# Patient Record
Sex: FEMALE | Race: WHITE | NOT HISPANIC OR LATINO | Employment: FULL TIME | URBAN - METROPOLITAN AREA
[De-identification: names, ages, dates, MRNs, and addresses within clinical notes are randomized per-mention and may not be internally consistent; named-entity substitution may affect disease eponyms.]

---

## 2023-02-20 PROBLEM — R87.613 HIGH GRADE SQUAMOUS INTRAEPITHELIAL LESION OF CERVIX: Status: ACTIVE | Noted: 2023-02-20

## 2023-02-20 PROBLEM — M54.6 THORACIC BACK PAIN: Status: ACTIVE | Noted: 2023-02-20

## 2023-02-20 PROBLEM — R10.2 PELVIC PRESSURE IN FEMALE: Status: ACTIVE | Noted: 2023-02-20

## 2023-02-20 PROBLEM — M54.50 LOW BACK PAIN: Status: ACTIVE | Noted: 2023-02-20

## 2023-02-20 PROBLEM — R73.9 HYPERGLYCEMIA: Status: ACTIVE | Noted: 2023-02-20

## 2023-02-20 PROBLEM — R10.9 ABDOMINAL PAIN: Status: ACTIVE | Noted: 2023-02-20

## 2023-02-20 PROBLEM — H93.8X3 SENSATION OF PLUGGED EAR ON BOTH SIDES: Status: ACTIVE | Noted: 2023-02-20

## 2023-02-20 PROBLEM — J30.9 ALLERGIC RHINITIS: Status: ACTIVE | Noted: 2023-02-20

## 2023-02-20 PROBLEM — N94.3 PREMENSTRUAL SYNDROME: Status: ACTIVE | Noted: 2023-02-20

## 2023-02-20 PROBLEM — N95.2 VAGINAL ATROPHY: Status: ACTIVE | Noted: 2023-02-20

## 2023-02-20 PROBLEM — N63.20 LEFT BREAST MASS: Status: ACTIVE | Noted: 2023-02-20

## 2023-02-20 PROBLEM — B35.1 MYCOTIC TOENAILS: Status: ACTIVE | Noted: 2023-02-20

## 2023-02-20 PROBLEM — R05.9 COUGH IN ADULT: Status: ACTIVE | Noted: 2023-02-20

## 2023-02-20 PROBLEM — H61.22 IMPACTED CERUMEN OF LEFT EAR: Status: ACTIVE | Noted: 2023-02-20

## 2023-02-20 PROBLEM — F41.8 DEPRESSION WITH ANXIETY: Status: ACTIVE | Noted: 2023-02-20

## 2023-02-20 PROBLEM — M25.50 ARTHRALGIA: Status: ACTIVE | Noted: 2023-02-20

## 2023-02-20 PROBLEM — E66.9 OBESITY: Status: ACTIVE | Noted: 2023-02-20

## 2023-02-20 PROBLEM — H90.0 CONDUCTIVE HEARING LOSS, BILATERAL: Status: ACTIVE | Noted: 2023-02-20

## 2023-02-20 PROBLEM — R92.8 ABNORMAL MAMMOGRAM: Status: ACTIVE | Noted: 2023-02-20

## 2023-02-20 PROBLEM — M79.89 SWELLING OF RIGHT HAND: Status: ACTIVE | Noted: 2023-02-20

## 2023-02-20 PROBLEM — R16.0 LIVER MASS: Status: ACTIVE | Noted: 2023-02-20

## 2023-02-20 PROBLEM — N95.1 PERIMENOPAUSAL SYMPTOMS: Status: ACTIVE | Noted: 2023-02-20

## 2023-02-20 PROBLEM — G43.909 HEADACHE, MIGRAINE: Status: ACTIVE | Noted: 2023-02-20

## 2023-02-20 PROBLEM — E78.00 HYPERCHOLESTEREMIA: Status: ACTIVE | Noted: 2023-02-20

## 2023-02-20 RX ORDER — PHENOL 1.4 %
AEROSOL, SPRAY (ML) MUCOUS MEMBRANE
COMMUNITY
Start: 2019-11-19 | End: 2024-05-16 | Stop reason: WASHOUT

## 2023-02-20 RX ORDER — GABAPENTIN 300 MG/1
300 CAPSULE ORAL 2 TIMES DAILY
COMMUNITY
Start: 2022-04-21 | End: 2023-11-06 | Stop reason: SDUPTHER

## 2023-02-20 RX ORDER — AZELASTINE 1 MG/ML
1 SPRAY, METERED NASAL 2 TIMES DAILY
COMMUNITY
Start: 2022-09-14 | End: 2024-02-23

## 2023-02-20 RX ORDER — ONDANSETRON HYDROCHLORIDE 8 MG/1
TABLET, FILM COATED ORAL
COMMUNITY

## 2023-02-20 RX ORDER — BUPROPION HYDROCHLORIDE 300 MG/1
1 TABLET ORAL DAILY
COMMUNITY
Start: 2019-08-13

## 2023-02-20 RX ORDER — TIZANIDINE 4 MG/1
4 TABLET ORAL EVERY 6 HOURS PRN
COMMUNITY
Start: 2021-01-19 | End: 2023-04-19 | Stop reason: ALTCHOICE

## 2023-02-20 RX ORDER — HYOSCYAMINE SULFATE 0.12 MG/1
0.12 TABLET, ORALLY DISINTEGRATING ORAL EVERY 4 HOURS PRN
COMMUNITY
Start: 2022-01-26 | End: 2023-11-06 | Stop reason: SDUPTHER

## 2023-02-20 RX ORDER — ACETAMINOPHEN 500 MG
50 TABLET ORAL DAILY
COMMUNITY

## 2023-02-20 RX ORDER — SUMATRIPTAN 50 MG/1
50 TABLET, FILM COATED ORAL ONCE AS NEEDED
COMMUNITY
Start: 2020-08-21 | End: 2023-05-23 | Stop reason: SDUPTHER

## 2023-02-20 RX ORDER — SERTRALINE HYDROCHLORIDE 50 MG/1
1 TABLET, FILM COATED ORAL DAILY
COMMUNITY
Start: 2020-09-02 | End: 2023-04-19 | Stop reason: ALTCHOICE

## 2023-04-18 PROBLEM — M54.50 LOW BACK PAIN: Status: RESOLVED | Noted: 2023-02-20 | Resolved: 2023-04-18

## 2023-04-18 PROBLEM — R92.8 ABNORMAL MAMMOGRAM: Status: RESOLVED | Noted: 2023-02-20 | Resolved: 2023-04-18

## 2023-04-18 PROBLEM — R10.9 ABDOMINAL PAIN: Status: RESOLVED | Noted: 2023-02-20 | Resolved: 2023-04-18

## 2023-04-18 PROBLEM — H61.22 IMPACTED CERUMEN OF LEFT EAR: Status: RESOLVED | Noted: 2023-02-20 | Resolved: 2023-04-18

## 2023-04-18 PROBLEM — H81.09 MENIERE'S DISEASE: Status: ACTIVE | Noted: 2023-04-18

## 2023-04-18 PROBLEM — M25.50 ARTHRALGIA: Status: RESOLVED | Noted: 2023-02-20 | Resolved: 2023-04-18

## 2023-04-18 PROBLEM — M79.89 SWELLING OF RIGHT HAND: Status: RESOLVED | Noted: 2023-02-20 | Resolved: 2023-04-18

## 2023-04-18 PROBLEM — Z00.00 ENCOUNTER FOR PREVENTATIVE ADULT HEALTH CARE EXAMINATION: Status: ACTIVE | Noted: 2023-04-18

## 2023-04-18 PROBLEM — N94.3 PREMENSTRUAL SYNDROME: Status: RESOLVED | Noted: 2023-02-20 | Resolved: 2023-04-18

## 2023-04-18 PROBLEM — H93.8X3 SENSATION OF PLUGGED EAR ON BOTH SIDES: Status: RESOLVED | Noted: 2023-02-20 | Resolved: 2023-04-18

## 2023-04-18 PROBLEM — H90.0 CONDUCTIVE HEARING LOSS, BILATERAL: Status: RESOLVED | Noted: 2023-02-20 | Resolved: 2023-04-18

## 2023-04-18 PROBLEM — R05.9 COUGH IN ADULT: Status: RESOLVED | Noted: 2023-02-20 | Resolved: 2023-04-18

## 2023-04-19 ENCOUNTER — OFFICE VISIT (OUTPATIENT)
Dept: PRIMARY CARE | Facility: CLINIC | Age: 53
End: 2023-04-19
Payer: COMMERCIAL

## 2023-04-19 VITALS
WEIGHT: 173.5 LBS | TEMPERATURE: 97.9 F | HEIGHT: 61 IN | SYSTOLIC BLOOD PRESSURE: 116 MMHG | BODY MASS INDEX: 32.76 KG/M2 | DIASTOLIC BLOOD PRESSURE: 63 MMHG | HEART RATE: 76 BPM

## 2023-04-19 DIAGNOSIS — R06.83 SNORING: ICD-10-CM

## 2023-04-19 DIAGNOSIS — H81.09 MENIERE'S DISEASE, UNSPECIFIED LATERALITY: ICD-10-CM

## 2023-04-19 DIAGNOSIS — R73.03 PREDIABETES: ICD-10-CM

## 2023-04-19 DIAGNOSIS — N89.8 ITCHING IN THE VAGINAL AREA: ICD-10-CM

## 2023-04-19 DIAGNOSIS — F41.8 DEPRESSION WITH ANXIETY: ICD-10-CM

## 2023-04-19 DIAGNOSIS — E78.00 HYPERCHOLESTEREMIA: ICD-10-CM

## 2023-04-19 DIAGNOSIS — G47.33 OBSTRUCTIVE SLEEP APNEA (ADULT) (PEDIATRIC): Primary | ICD-10-CM

## 2023-04-19 DIAGNOSIS — Z00.00 ENCOUNTER FOR PREVENTATIVE ADULT HEALTH CARE EXAMINATION: ICD-10-CM

## 2023-04-19 DIAGNOSIS — F90.9 ATTENTION DEFICIT HYPERACTIVITY DISORDER (ADHD), UNSPECIFIED ADHD TYPE: ICD-10-CM

## 2023-04-19 DIAGNOSIS — G43.809 OTHER MIGRAINE WITHOUT STATUS MIGRAINOSUS, NOT INTRACTABLE: ICD-10-CM

## 2023-04-19 DIAGNOSIS — N95.1 PERIMENOPAUSAL SYMPTOMS: ICD-10-CM

## 2023-04-19 DIAGNOSIS — Z82.49 FAMILY HISTORY OF HEART DISEASE IN MALE FAMILY MEMBER BEFORE AGE 55: ICD-10-CM

## 2023-04-19 PROBLEM — R10.2 PELVIC PRESSURE IN FEMALE: Status: RESOLVED | Noted: 2023-02-20 | Resolved: 2023-04-19

## 2023-04-19 PROCEDURE — 90471 IMMUNIZATION ADMIN: CPT | Performed by: INTERNAL MEDICINE

## 2023-04-19 PROCEDURE — 90715 TDAP VACCINE 7 YRS/> IM: CPT | Performed by: INTERNAL MEDICINE

## 2023-04-19 PROCEDURE — 90739 HEPB VACC 2/4 DOSE ADULT IM: CPT | Performed by: INTERNAL MEDICINE

## 2023-04-19 PROCEDURE — 99396 PREV VISIT EST AGE 40-64: CPT | Performed by: INTERNAL MEDICINE

## 2023-04-19 PROCEDURE — 1036F TOBACCO NON-USER: CPT | Performed by: INTERNAL MEDICINE

## 2023-04-19 PROCEDURE — 90472 IMMUNIZATION ADMIN EACH ADD: CPT | Performed by: INTERNAL MEDICINE

## 2023-04-19 RX ORDER — LISDEXAMFETAMINE DIMESYLATE 30 MG/1
30 CAPSULE ORAL EVERY MORNING
COMMUNITY

## 2023-04-19 NOTE — ASSESSMENT & PLAN NOTE
With significant family history, CAC 0 last year, interested in Lp(a) and hsCRP testing, will confirm with insurance regarding coverage.

## 2023-04-19 NOTE — PROGRESS NOTES
Subjective     Patient ID: Abril Gregory is a 52 y.o. female who presents for Annual Exam.  HPI    52-year-old female here for preventive care visit.  Last seen in January for establishment of care.    1/23: hep b not immune, lipids increased somewhat ascvd 1.8%, a1c improved somewhat  - Has been experiencing some joint stiffness with when not moving for a more prolonged period of time. At times she feels stiffness in her joints.       PMHx:  - Prediabetes A1c 5.8%   - Breast cancer risk -followed by breast surgery gets yearly MRI in addition to mammograms, declines prophylactic tamoxifen out of concern for increasing vasomotor symptoms  - Migraine headaches - On sumatriptan uses 1-2x/month, gets chills prior to onset of the migraine, gets an intense sensation/pressure in her belly thought perhaps related to abdominal migraines, then takes zofran as needed for nausea, uses hyooscyamine as needed for these symptoms  - Perimenopause with vasomotor symptoms treated supportively, now has been experiencing cold extremities and crying on a daily basis, interested in checking bloodwork. Has been experiencing emotional lability believes related to perimenopause, though not depressed   - Anxiety and depression - previously on sertraline 50mg and wellbutrin 300mg with interested in tapering sertraline out of concern that it was making her feel less passionate.  Also follows with therapist.  Sertraline was discontinued and was alleviated on Vyvanse.  Last seen by psychiatry in mid March recommended 3-month follow-up. Overall doing well on vyvanse, feeling more focused and less hungry.   - ADHD -newly diagnosed followed by psychiatry at [place] now on vyvanse   - Thoracic back pain - s/p T3-T4 spinal fusion with history of radiculopathy - resolved, on gabapentin but continues to use it which may be helping.   - Meniere's disease many years - has reduced her salt intake significantly, vertiginous symptoms precipitated by  intense exercise, ringing in her right ear, has had hearing loss on the left, uses flonase and afrin as needed, also azelastine   - Prediabetes-lifestyle modification  - HLD +  with early family history of MI (father), CAC 2021 0, looks into insurance reagarding LP(a)  - Binge eating disorder newly diagnosed followed by psychiatry [facility] ADHD prescribed lisdexamfetamine (vyvanse) 30mg , has lost 4 pounds since last visit related to reduced appetite on the medication.     Social:  - Previously a daily marijuana smoker for the past several years, quit 3 weeks ago, has had some difficulty sleeping since, started melatonin a few days ago. still problems falling asleep.   -  at [employer] [employer]  - No tobacco, occasional alcohol, no drugs   - Lives at home with two cats   - Mom and brother and cousins live around.     Lifestyle   - Diet - has been eating less, not hungry anymore. Has a great lunch with salad and a soup, dinner changes.   - Exercise - pelaton that she rides occasionally, walks to work and walks a lot at work.   - Sleep - overall well gets up to urinate all the time (has been longstanding), does snore, at times she has noted that she has to find the right position to get the right amount of air. Feels significant dryness in the mouth at night, no significant morning headaches       General: No constitutional symptoms, weight loss as described   HEENT: No headaches, changes in vision or hearing, no sinus or dental issues   Cardiac: No chest pain, palpitations, dyspnea on exertion   Lungs: No cough, wheezing, shortness of breath   Abdomen: No abdominal pain, nausea/vomiting, diarrhea or constipation   : No urinary complaints   Musculoskeletal: as described  Heme: No bleeding or thrombosis issues   Lymph: No swollen lymph glands   Skin: No rashes or lesions   Psych: as described  All other systems reviewed and are negative      Objective   Physical Exam  General: Awake,  alert, appears stated age   Head/eyes/ears: NCAT, EOMI, PERRL, TM WNL, no cerumen  Throat: moist mucus membranes, no pharyngeal erythema  Neck: Supple, nontender, no lymphadenopathy, thyroid exam unremarkable   Breast exam: declined   Heart: RRR, no murmurs, rubs or gallops  Lungs: CTA bilaterally, no rhonchi rales or wheezes   Abdomen: Soft, NT/ND  Extremities: No edema, 2+ DP pulses   Vaginal exam: chaperone offered and declined. No overt abnormalities appreciated at vulva, introitus or anus.   Skin: No concerning skin lesions on visualized skin   Neuro: AAO x 3, no FND, gait unremarkable    Assessment/Plan   Problem List Items Addressed This Visit          Nervous    Itching in the vaginal area     To followup with GYN no overt findings appreciated on exam             Endocrine/Metabolic    Prediabetes     Healthy lifestyle reviewed including diet and exercise with a focus on unprocessed Mediterranean diet, sustained aerobic exercise.            Other    Depression with anxiety     Followed by psychiatry stable on wellbutrin, tapered off sertraline. Reports emotional lability believes to be related to perimenopause, denies worsening symptoms of depression or anxiety. Has adequate followup with both psychiatry and therapy.         Headache, migraine     Stable on current regimen.         Hypercholesteremia     With significant family history, CAC 0 last year, interested in Lp(a) and hsCRP testing, will confirm with insurance regarding coverage.         Perimenopausal symptoms     Improving vasomotor symptoms with worsening emotional lability, will check FSH levels.         Relevant Orders    FSH    Meniere's disease     Chronic and stable.         Encounter for preventative adult health care examination - Primary     Age-appropriate screening performed  -No additional pertinent family history or toxic habits  -No high risk sexual behavior, declines STI screening  Cancer screening  -Pap smears no longer required    -Mammogram + breast MRI UTD   - Colonoscopy UTD 2020 repeat 5 years   - Derm - recent exam wnl.   Immunizations  -Up-to-date with flu, believes UTD with Tdap, COVID, Shingrix vaccinations, received pneumococcal vaccine.   - Not immune to Hepatitis B. Will vaccinate          Relevant Orders    Tdap vaccine, age 10 years and older  (BOOSTRIX) (Completed)    Follow Up In [facility] - PCP    Family history of heart disease in male family member before age 55    ADHD     Newly diagnosed, maintained on Vyvanse with improvement in symptoms as well as appetite.          Other Visit Diagnoses       Snoring        With possible sleep apnea, will order HSAT    Relevant Orders    Home sleep apnea test (HSAT)          Followup 1 year or prn

## 2023-04-19 NOTE — ASSESSMENT & PLAN NOTE
Healthy lifestyle reviewed including diet and exercise with a focus on unprocessed Mediterranean diet, sustained aerobic exercise.

## 2023-04-19 NOTE — ASSESSMENT & PLAN NOTE
Age-appropriate screening performed  -No additional pertinent family history or toxic habits  -No high risk sexual behavior, declines STI screening  Cancer screening  -Pap smears no longer required   -Mammogram + breast MRI UTD   - Colonoscopy UTD 2020 repeat 5 years   - Derm - recent exam wnl.   Immunizations  -Up-to-date with flu, believes UTD with Tdap, COVID, Shingrix vaccinations, received pneumococcal vaccine.   - Not immune to Hepatitis B. Will vaccinate

## 2023-04-20 NOTE — ASSESSMENT & PLAN NOTE
Followed by psychiatry stable on wellbutrin, tapered off sertraline. Reports emotional lability believes to be related to perimenopause, denies worsening symptoms of depression or anxiety. Has adequate followup with both psychiatry and therapy.

## 2023-05-18 ENCOUNTER — APPOINTMENT (OUTPATIENT)
Dept: PRIMARY CARE | Facility: CLINIC | Age: 53
End: 2023-05-18
Payer: COMMERCIAL

## 2023-05-18 ENCOUNTER — CLINICAL SUPPORT (OUTPATIENT)
Dept: PRIMARY CARE | Facility: CLINIC | Age: 53
End: 2023-05-18
Payer: COMMERCIAL

## 2023-05-18 DIAGNOSIS — Z23 NEED FOR HEPATITIS B VACCINATION: Primary | ICD-10-CM

## 2023-05-18 PROCEDURE — 90471 IMMUNIZATION ADMIN: CPT | Performed by: INTERNAL MEDICINE

## 2023-05-18 PROCEDURE — 90743 HEPB VACC 2 DOSE ADOLESC IM: CPT | Performed by: INTERNAL MEDICINE

## 2023-05-19 ENCOUNTER — APPOINTMENT (OUTPATIENT)
Dept: PRIMARY CARE | Facility: CLINIC | Age: 53
End: 2023-05-19
Payer: COMMERCIAL

## 2023-05-23 DIAGNOSIS — G43.809 OTHER MIGRAINE WITHOUT STATUS MIGRAINOSUS, NOT INTRACTABLE: Primary | ICD-10-CM

## 2023-05-23 RX ORDER — SUMATRIPTAN 50 MG/1
50 TABLET, FILM COATED ORAL ONCE AS NEEDED
Qty: 9 TABLET | Refills: 1 | Status: SHIPPED | OUTPATIENT
Start: 2023-05-23 | End: 2023-11-08 | Stop reason: SDUPTHER

## 2023-09-28 LAB
ESTRADIOL (PG/ML) IN SER/PLAS: 142 PG/ML
FOLLITROPIN (IU/L) IN SER/PLAS: 8.5 IU/L
LUTEINIZING HORMONE (IU/ML) IN SER/PLAS: 8.1 IU/L

## 2023-11-06 DIAGNOSIS — G43.809 OTHER MIGRAINE WITHOUT STATUS MIGRAINOSUS, NOT INTRACTABLE: ICD-10-CM

## 2023-11-06 DIAGNOSIS — M54.6 THORACIC BACK PAIN, UNSPECIFIED BACK PAIN LATERALITY, UNSPECIFIED CHRONICITY: Primary | ICD-10-CM

## 2023-11-06 RX ORDER — HYOSCYAMINE SULFATE 0.12 MG/1
0.12 TABLET, ORALLY DISINTEGRATING ORAL EVERY 4 HOURS PRN
Qty: 30 TABLET | Refills: 0 | Status: SHIPPED | OUTPATIENT
Start: 2023-11-06 | End: 2023-11-08 | Stop reason: SDUPTHER

## 2023-11-06 RX ORDER — GABAPENTIN 300 MG/1
300 CAPSULE ORAL 2 TIMES DAILY
Qty: 60 CAPSULE | Refills: 0 | Status: SHIPPED | OUTPATIENT
Start: 2023-11-06 | End: 2023-11-08 | Stop reason: SDUPTHER

## 2023-11-06 NOTE — TELEPHONE ENCOUNTER
Pt requesting refills on her meds. She originally got them from her previous PCP and know that she might not be able to get them refilled with you.     Appt has been scheduled.

## 2023-11-08 ENCOUNTER — OFFICE VISIT (OUTPATIENT)
Dept: PRIMARY CARE | Facility: CLINIC | Age: 53
End: 2023-11-08
Payer: COMMERCIAL

## 2023-11-08 VITALS — OXYGEN SATURATION: 96 % | DIASTOLIC BLOOD PRESSURE: 74 MMHG | HEART RATE: 83 BPM | SYSTOLIC BLOOD PRESSURE: 127 MMHG

## 2023-11-08 DIAGNOSIS — Z00.00 ENCOUNTER FOR PREVENTATIVE ADULT HEALTH CARE EXAMINATION: ICD-10-CM

## 2023-11-08 DIAGNOSIS — G43.809 OTHER MIGRAINE WITHOUT STATUS MIGRAINOSUS, NOT INTRACTABLE: ICD-10-CM

## 2023-11-08 DIAGNOSIS — M54.6 THORACIC BACK PAIN, UNSPECIFIED BACK PAIN LATERALITY, UNSPECIFIED CHRONICITY: ICD-10-CM

## 2023-11-08 DIAGNOSIS — H81.09 MENIERE'S DISEASE, UNSPECIFIED LATERALITY: ICD-10-CM

## 2023-11-08 DIAGNOSIS — Z23 IMMUNIZATION DUE: Primary | ICD-10-CM

## 2023-11-08 PROCEDURE — 90471 IMMUNIZATION ADMIN: CPT | Performed by: INTERNAL MEDICINE

## 2023-11-08 PROCEDURE — 90743 HEPB VACC 2 DOSE ADOLESC IM: CPT | Performed by: INTERNAL MEDICINE

## 2023-11-08 PROCEDURE — 1036F TOBACCO NON-USER: CPT | Performed by: INTERNAL MEDICINE

## 2023-11-08 PROCEDURE — 99214 OFFICE O/P EST MOD 30 MIN: CPT | Performed by: INTERNAL MEDICINE

## 2023-11-08 RX ORDER — GABAPENTIN 300 MG/1
300 CAPSULE ORAL 2 TIMES DAILY
Qty: 180 CAPSULE | Refills: 1 | Status: SHIPPED | OUTPATIENT
Start: 2023-11-08 | End: 2024-05-06

## 2023-11-08 RX ORDER — SUMATRIPTAN 20 MG/1
SPRAY NASAL
Qty: 1 EACH | Refills: 0 | Status: SHIPPED | OUTPATIENT
Start: 2023-11-08

## 2023-11-08 RX ORDER — ONDANSETRON 8 MG/1
8 TABLET, ORALLY DISINTEGRATING ORAL EVERY 8 HOURS PRN
Qty: 20 TABLET | Refills: 1 | Status: SHIPPED | OUTPATIENT
Start: 2023-11-08 | End: 2023-11-15

## 2023-11-08 RX ORDER — SUMATRIPTAN 50 MG/1
50 TABLET, FILM COATED ORAL ONCE AS NEEDED
Qty: 9 TABLET | Refills: 3 | Status: SHIPPED | OUTPATIENT
Start: 2023-11-08 | End: 2023-12-08

## 2023-11-08 RX ORDER — HYOSCYAMINE SULFATE 0.12 MG/1
0.12 TABLET, ORALLY DISINTEGRATING ORAL EVERY 4 HOURS PRN
Qty: 30 TABLET | Refills: 0 | Status: SHIPPED | OUTPATIENT
Start: 2023-11-08 | End: 2023-12-08

## 2023-11-08 ASSESSMENT — PATIENT HEALTH QUESTIONNAIRE - PHQ9
1. LITTLE INTEREST OR PLEASURE IN DOING THINGS: NOT AT ALL
SUM OF ALL RESPONSES TO PHQ9 QUESTIONS 1 AND 2: 0
2. FEELING DOWN, DEPRESSED OR HOPELESS: NOT AT ALL

## 2023-11-08 ASSESSMENT — PAIN SCALES - GENERAL: PAINLEVEL: 0-NO PAIN

## 2023-11-08 NOTE — PATIENT INSTRUCTIONS
Abril,   Followup in April for your physical with labs 1 week prior   Migrianes - take magnesium every day + ribloflavin (400mg of each) daily to help prevent migraine headaches   Prescription for intranasal formulation of imitrex. Use as directed   Schedule sleep study

## 2023-11-08 NOTE — PROGRESS NOTES
Subjective   Patient ID: Abril Gregory is a 53 y.o. female who presents for Follow-up.  HPI  53F here for followup visit, last seen in April.     - Migraine headaches - On sumatriptan uses 1-2x/month, gets chills prior to onset of the migraine, gets an intense sensation/pressure in her belly thought perhaps related to abdominal migraines, then takes zofran as needed for nausea, uses hyooscyamine as needed for these symptoms. She has been noting more frequent migraine headaches. Since the end of August has had 6 migraine headaches, more than she has had in a while. They happen at sleep so wakes up and vomits violently. Prior to onset of migraines gets diffuse chills, typically occurs in the middle of the month, is miserable for days, sometimes gets abdominal migraines which hyoscyamine helps and also helps with the chills. She reports that the migraine symptoms that have wakened her up at night has had onset since the diagnosis of her headaches over 20 years ago, though over the last several months they are progressively worsening.   - Thoracic back pain - s/p T3-T4 spinal fusion with history of radiculopathy - resolved, on gabapentin has been taking nightly but makes her feel sleepy.     PMHx:  - Prediabetes A1c 5.8%   - Breast cancer risk -followed by breast surgery gets yearly MRI in addition to mammograms, declines prophylactic tamoxifen out of concern for increasing vasomotor symptoms  - Perimenopause with vasomotor symptoms treated supportively  - Anxiety and depression - on wellbutrin stable  - ADHD -newly diagnosed followed by psychiatry at [facility] now on vyvanse   - Meniere's disease many years - has reduced her salt intake significantly, vertiginous symptoms precipitated by intense exercise, ringing in her right ear, has had hearing loss on the left, uses flonase and afrin as needed, also azelastine   - Prediabetes-lifestyle modification  - HLD +  with early family history of MI (father), CAC 2021  0, looks into insurance reagarding LP(a)  - Binge eating disorder newly diagnosed followed by psychiatry [facility] ADHD prescribed lisdexamfetamine (vyvanse) 30mg , has lost 4 pounds since last visit related to reduced appetite on the medication.      Social:  - Previously a daily marijuana smoker for the past several years, quit 3 weeks ago, has had some difficulty sleeping since, started melatonin a few days ago. still problems falling asleep.   -  at [employer]  - No tobacco, occasional alcohol, no drugs   - Lives at home with two cats   - Mom and brother and cousins live around.     Current Outpatient Medications   Medication Instructions    azelastine (Astelin) 137 mcg (0.1 %) nasal spray 1 spray, nasal, 2 times daily    buPROPion XL (Wellbutrin XL) 300 mg 24 hr tablet 1 tablet, oral, Daily    cholecalciferol (VITAMIN D-3) 50 mcg, oral, Daily    gabapentin (NEURONTIN) 300 mg, oral, 2 times daily    hyoscyamine (ANASPAZ) 0.125 mg, sublingual, Every 4 hours PRN    lisdexamfetamine (VYVANSE) 30 mg, oral, Every morning    multivitamin-min-iron-FA-vit K (Adults Multivitamin) 18 mg iron-400 mcg-25 mcg tablet oral    ondansetron (Zofran) 8 mg tablet oral    ondansetron ODT (ZOFRAN-ODT) 8 mg, oral, Every 8 hours PRN    SUMAtriptan (Imitrex) 20 mg/actuation nasal spray 20 mg as a single dose in 1 nostril; if symptoms persist or return, may repeat dose after =2 hours.    SUMAtriptan (IMITREX) 50 mg, oral, Once as needed        Objective     /74   Pulse 83   SpO2 96%     Physical Exam  ,General: Alert and oriented, in no apparent distress   HEENT: No conjunctival erythema, no external facial lesions   Lungs: Breathing comfortably  Skin: No evidence of skin breakdown.  Neuro: AAO x 3, answering questions appropriately, no obvious cranial nerve deficits    Assessment/Plan   Problem List Items Addressed This Visit       Headache, migraine     With alarm feature of awakening her  at night and progressively worsening. Will obtain brain imaging to rule out additional abnormality, preferably an MRI given history of Meniere's disease as well.          Relevant Medications    SUMAtriptan (Imitrex) 20 mg/actuation nasal spray    ondansetron ODT (Zofran-ODT) 8 mg disintegrating tablet    hyoscyamine 0.125 mg disintegrating tablet    SUMAtriptan (Imitrex) 50 mg tablet    Thoracic back pain  With neuropathy improved with nightly gabapentin.     Relevant Medications    gabapentin (Neurontin) 300 mg capsule    Encounter for preventative adult health care examination    Relevant Orders    CBC and Auto Differential    Comprehensive Metabolic Panel    Lipid Panel    Hemoglobin A1C    TSH with reflex to Free T4 if abnormal    Vitamin D 25-Hydroxy,Total (for eval of Vitamin D levels)     Other Visit Diagnoses       Immunization due    -  Primary    Relevant Orders    Hepatitis B vaccine, 20 yrs and older (RECOMBIVAX, ENGERIX) (Completed)

## 2023-11-13 RX ORDER — HYOSCYAMINE SULFATE 0.12 MG/1
TABLET, ORALLY DISINTEGRATING ORAL
Qty: 30 TABLET | Refills: 0 | Status: SHIPPED | OUTPATIENT
Start: 2023-11-13 | End: 2023-12-04

## 2023-11-22 DIAGNOSIS — G43.809 OTHER MIGRAINE WITHOUT STATUS MIGRAINOSUS, NOT INTRACTABLE: ICD-10-CM

## 2023-11-24 DIAGNOSIS — G43.809 OTHER MIGRAINE WITHOUT STATUS MIGRAINOSUS, NOT INTRACTABLE: ICD-10-CM

## 2023-11-27 NOTE — PROGRESS NOTES
AUDIOLOGY ADULT AUDIOMETRIC EVALUATION      Name:  Abril Gregory  :  1970  Age:  53 y.o.  Date of Evaluation:  2023    IMPRESSIONS     Today's test results are consistent with normal sloping to severe conductive hearing loss in the LE and a moderately-severe rising to mild and sloping again to moderate mixed hearing loss in the RE. Results show progression in thresholds when compared with most recent hearing evaluation performed on 2020. Discussed results and recommendations with patient.  Questions were addressed and the patient was encouraged to contact our department should concerns arise.    RECOMMENDATIONS     Continue medical follow up with PCP/ENT.  Return for annual hearing evaluation or sooner should concerns arise.    Time: 1354-1168    HISTORY     Reason for visit:  Abril Gregory is seen today at the request of Ilene Barr CNP for an evaluation of hearing.  Patient has a known history of ear surgeries bilaterally in childhood (reportedly tympanoplasty and ossicular chain repair). Additionally, in the past she has reported previous diagnosis of Meniere's Disease in LE with associated reduced hearing and dizziness. Today, she reported decrease in hearing with intermittent bilateral tinnitus. Previous history of vertigo/spinning. Patient denied history of aural fullness or excessive noise exposure. No history of hearing aid use.    TEST RESULTS     Otoscopic Evaluation:  Right Ear: Clear ear canal.  Left Ear: Clear ear canal.    Tympanometry & Acoustic Reflexes:  Right Ear: Restricted eardrum mobility consistent with outer/middle ear involvement.   Left Ear: Negative middle ear pressure.    Pure Tone Audiometry:    Right Ear: Moderately-severe mixed hearing loss rising to mild  from 500-6000 Hz, and sloping again to a moderate mixed hearing loss.  Left Ear: Mild conductive hearing loss from 125-250 Hz rising to WNL and sloping from 500-2000 Hz, and sloping again to a severe conductive  hearing loss.    Speech Audiometry:   Right Ear:  Speech Reception Threshold (SRT) was obtained at 40 dBHL. Word Recognition scores were excellent (100%) in quiet when words were presented at 75 dBHL.  Left Ear:  Speech Reception Threshold (SRT) was obtained at 25 dBHL. Word Recognition scores were excellent (100%) in quiet when words were presented at 75 dBHL.    Distortion Product Otoacoustic Emissions:  DNT.    Testing and interpretation of results completed KYLIE Dozier, CCC-A. It was my pleasure to evaluate this patient.       KYLIE Dozier, CCC-A  Licensed Audiologist      Degree of Hearing Sensitivity Decibel Range   Within Normal Limits (WNL) 0-25   Mild 26-40   Moderate 41-55   Moderately-Severe 56-70   Severe 71-90   Profound 91+      Key   CNT/DNT Could Not Test/Did Not Test   TM Tympanic Membrane   WNL Within Normal Limits   HA Hearing Aid   SNHL Sensorineural Hearing Loss   CHL Conductive Hearing Loss   NIHL Noise-Induced Hearing Loss   ECV Ear Canal Volume   RE/LE Right Ear/Left Ear        AUDIOGRAM

## 2023-11-28 ENCOUNTER — APPOINTMENT (OUTPATIENT)
Dept: PRIMARY CARE | Facility: CLINIC | Age: 53
End: 2023-11-28
Payer: COMMERCIAL

## 2023-11-29 ENCOUNTER — CLINICAL SUPPORT (OUTPATIENT)
Dept: AUDIOLOGY | Facility: CLINIC | Age: 53
End: 2023-11-29
Payer: COMMERCIAL

## 2023-11-29 ENCOUNTER — OFFICE VISIT (OUTPATIENT)
Dept: OTOLARYNGOLOGY | Facility: CLINIC | Age: 53
End: 2023-11-29
Payer: COMMERCIAL

## 2023-11-29 VITALS — BODY MASS INDEX: 32.1 KG/M2 | WEIGHT: 170 LBS | HEIGHT: 61 IN

## 2023-11-29 DIAGNOSIS — H90.6 MIXED CONDUCTIVE AND SENSORINEURAL HEARING LOSS OF BOTH EARS: Primary | ICD-10-CM

## 2023-11-29 DIAGNOSIS — H69.93 DYSFUNCTION OF BOTH EUSTACHIAN TUBES: ICD-10-CM

## 2023-11-29 DIAGNOSIS — H61.23 BILATERAL IMPACTED CERUMEN: Primary | ICD-10-CM

## 2023-11-29 DIAGNOSIS — H90.6 MIXED CONDUCTIVE AND SENSORINEURAL HEARING LOSS OF BOTH EARS: ICD-10-CM

## 2023-11-29 DIAGNOSIS — L29.9 EAR ITCH: ICD-10-CM

## 2023-11-29 PROCEDURE — 92567 TYMPANOMETRY: CPT

## 2023-11-29 PROCEDURE — 99204 OFFICE O/P NEW MOD 45 MIN: CPT | Performed by: NURSE PRACTITIONER

## 2023-11-29 PROCEDURE — 92557 COMPREHENSIVE HEARING TEST: CPT

## 2023-11-29 PROCEDURE — 1036F TOBACCO NON-USER: CPT | Performed by: NURSE PRACTITIONER

## 2023-11-29 RX ORDER — MOMETASONE FUROATE 1 MG/G
CREAM TOPICAL
Qty: 15 G | Refills: 1 | Status: SHIPPED | OUTPATIENT
Start: 2023-11-29

## 2023-11-29 NOTE — PROGRESS NOTES
Subjective   Patient ID: Abril Gregory is a 53 y.o. female who presents for Hearing Loss.  Hearing Loss      This patient is referred for evaluation of a sensation of right greater than left itching.  The patient is not accompanied by anyone.   When asked about ear pain, hearing loss, itching, discharge from ear, tinnitus, aural fullness or autophony, the patient admits to bilateral hearing loss and right greater than left itching/flaking skin.  She endorses hearing loss nearly all of her life but feels that it has gradually progressed.  The patient does not wear a hearing aid.  When asked about a significant past otological history including history of prior ear surgery, noise exposure, exposure to ototoxic drugs or agents, and/or family history of hearing loss, the patient admits to left tympanomastoidectomy and right possible tympanoplasty as well as PE tube placements.  Surgeries were done as a child living in [place].  She also reports being diagnosed with Ménière's disease in her 30s while living in [facility].  She does not currently have any vertigo.  Her PCP has recently ordered a MRI due to migraines.  Patient uses azelastine spray.    Review of Systems   HENT:  Positive for hearing loss.      A comprehensive or 10 points review of the patient´s constitutional, neurological, HEENT, pulmonary, cardiovascular and genito-urinary systems showed only those mentioned in history of present illness.    Objective   Physical Exam  Constitutional: no fever, chills, weight loss or weight gain   General appearance: Appears well, well-nourished, well groomed. No acute distress.   Communication: Normal communication   Psychiatric: Oriented to person, place and time. Normal mood and affect.   Neurologic: Cranial nerves II-XII grossly intact and symmetric bilaterally.   Head and Face:   Head: Atraumatic with no masses, lesions or scarring.   Face: Normal symmetry, no paralysis, synkinesis or facial tic. No scars or deformities.      Eyes: Conjunctiva not edematous or erythematous   Ears: External inspection of ears with no deformity, scars or masses with the exception of flaking to right conchal bowl.  Bilateral canals narrow with cerumen impactions.     Neck: Normal appearing, symmetric, trachea midline.   Cardiovascular: Examination of peripheral vascular system shows no clubbing or cyanosis.   Respiratory: No respiratory distress increased work of breathing. Inspection of the chest with symmetric chest expansion and normal respiratory effort.   Skin: No rashes in the head or neck  My interpretation of the audiogram done today is right-sided with moderate mixed hearing loss and left side with mild to moderate mixed hearing loss.  Excellent word recognition scores bilaterally.  Type B tympanogram on the right with small canal volume and type C on the left.    When compared to her previous audiogram from July 2020, there has been progression of bilateral conductive hearing loss especially on the right in the low pitches.  Previous tympanograms were normal.  Assessment/Plan     This patient presents for initial evaluation of acute acquired bilateral cerumen impaction as well as chronic bilateral ear itching, bilateral mixed hearing loss, and bilateral eustachian tube dysfunction.    We discussed that right TM is quite retracted but left graft looks good.  I recommended she continue her azelastine but also add Flonase and cycled Afrin.  Given the progression of her conductive hearing loss, I recommended CT IAC follow-up with one of my surgical partners.  I also recommended mometasone cream for her ear itching.  We discussed that her canals are very narrow and I am hesitant to order any steroid drops at this time.  Her canal itching may have been due to the cerumen.  If so, this should have resolved with cleaning.  If itching does not improve, asked that she contact my office.  Patient is in agreement with the plan.  All questions were  answered to patient's satisfaction.    This note was created using speech recognition transcription software. Despite proofreading, several typographical errors might be present that might affect the meaning of the content. Please call with any questions.     Patient ID: Abril Gregory is a 53 y.o. female.    Ear cerumen removal    Date/Time: 11/29/2023 4:04 PM    Performed by: ANNA Collado  Authorized by: ANNA Collado    Consent:     Consent obtained:  Verbal    Consent given by:  Patient    Risks discussed:  Pain    Alternatives discussed:  No treatment  Procedure details:     Location:  L ear and R ear    Procedure type comment:  Right angle hook and suction    Procedure outcomes: cerumen removed    Post-procedure details:     Inspection:  No bleeding, ear canal clear and TM intact    Hearing quality:  Normal    Procedure completion:  Tolerated well, no immediate complications  Comments:      Under the microscope, right TM is quite retracted centrally.  Left graft intact.  No obvious recurrence of cholesteatoma noted, but bilateral EACs are quite narrow.

## 2023-12-04 RX ORDER — HYOSCYAMINE SULFATE 0.12 MG/1
TABLET, ORALLY DISINTEGRATING ORAL
Qty: 30 TABLET | Refills: 0 | Status: SHIPPED | OUTPATIENT
Start: 2023-12-04

## 2023-12-11 RX ORDER — HYOSCYAMINE SULFATE 0.12 MG/1
TABLET, ORALLY DISINTEGRATING ORAL
Qty: 30 TABLET | Refills: 0 | OUTPATIENT
Start: 2023-12-11

## 2023-12-18 ENCOUNTER — APPOINTMENT (OUTPATIENT)
Dept: RADIOLOGY | Facility: CLINIC | Age: 53
End: 2023-12-18
Payer: COMMERCIAL

## 2023-12-28 ENCOUNTER — TELEPHONE (OUTPATIENT)
Dept: PRIMARY CARE | Facility: CLINIC | Age: 53
End: 2023-12-28
Payer: COMMERCIAL

## 2023-12-28 NOTE — TELEPHONE ENCOUNTER
This is for the on call Doctor patient is not feeling well and she is requesting medication for a cough she is outside of [facility] so if medication is approved she needs it sent to the  [facility] I've already changed the pharmacy in this chart I did advise patient the on call doctor does not start until 5 pm .

## 2024-02-23 DIAGNOSIS — J30.9 ALLERGIC RHINITIS, UNSPECIFIED SEASONALITY, UNSPECIFIED TRIGGER: Primary | ICD-10-CM

## 2024-02-23 RX ORDER — AZELASTINE 1 MG/ML
2 SPRAY, METERED NASAL 2 TIMES DAILY
Qty: 30 ML | Refills: 2 | Status: SHIPPED | OUTPATIENT
Start: 2024-02-23

## 2024-04-03 ENCOUNTER — LAB (OUTPATIENT)
Dept: LAB | Facility: LAB | Age: 54
End: 2024-04-03
Payer: COMMERCIAL

## 2024-04-03 DIAGNOSIS — N95.1 PERIMENOPAUSAL SYMPTOMS: ICD-10-CM

## 2024-04-03 DIAGNOSIS — Z00.00 ENCOUNTER FOR PREVENTATIVE ADULT HEALTH CARE EXAMINATION: ICD-10-CM

## 2024-04-03 LAB
25(OH)D3 SERPL-MCNC: 45 NG/ML (ref 30–100)
ALBUMIN SERPL BCP-MCNC: 3.9 G/DL (ref 3.4–5)
ALP SERPL-CCNC: 55 U/L (ref 33–110)
ALT SERPL W P-5'-P-CCNC: 6 U/L (ref 7–45)
ANION GAP SERPL CALC-SCNC: 11 MMOL/L (ref 10–20)
AST SERPL W P-5'-P-CCNC: 10 U/L (ref 9–39)
BASOPHILS # BLD AUTO: 0.05 X10*3/UL (ref 0–0.1)
BASOPHILS NFR BLD AUTO: 0.7 %
BILIRUB SERPL-MCNC: 0.4 MG/DL (ref 0–1.2)
BUN SERPL-MCNC: 18 MG/DL (ref 6–23)
CALCIUM SERPL-MCNC: 8.9 MG/DL (ref 8.6–10.6)
CHLORIDE SERPL-SCNC: 103 MMOL/L (ref 98–107)
CHOLEST SERPL-MCNC: 201 MG/DL (ref 0–199)
CHOLESTEROL/HDL RATIO: 3.6
CO2 SERPL-SCNC: 28 MMOL/L (ref 21–32)
CREAT SERPL-MCNC: 0.75 MG/DL (ref 0.5–1.05)
EGFRCR SERPLBLD CKD-EPI 2021: >90 ML/MIN/1.73M*2
EOSINOPHIL # BLD AUTO: 0.11 X10*3/UL (ref 0–0.7)
EOSINOPHIL NFR BLD AUTO: 1.5 %
ERYTHROCYTE [DISTWIDTH] IN BLOOD BY AUTOMATED COUNT: 13.2 % (ref 11.5–14.5)
EST. AVERAGE GLUCOSE BLD GHB EST-MCNC: 114 MG/DL
FSH SERPL-ACNC: 6.9 IU/L
GLUCOSE SERPL-MCNC: 74 MG/DL (ref 74–99)
HBA1C MFR BLD: 5.6 %
HCT VFR BLD AUTO: 41.3 % (ref 36–46)
HDLC SERPL-MCNC: 55.6 MG/DL
HGB BLD-MCNC: 12.8 G/DL (ref 12–16)
IMM GRANULOCYTES # BLD AUTO: 0.02 X10*3/UL (ref 0–0.7)
IMM GRANULOCYTES NFR BLD AUTO: 0.3 % (ref 0–0.9)
LDLC SERPL CALC-MCNC: 127 MG/DL
LYMPHOCYTES # BLD AUTO: 2.98 X10*3/UL (ref 1.2–4.8)
LYMPHOCYTES NFR BLD AUTO: 39.8 %
MCH RBC QN AUTO: 28.6 PG (ref 26–34)
MCHC RBC AUTO-ENTMCNC: 31 G/DL (ref 32–36)
MCV RBC AUTO: 92 FL (ref 80–100)
MONOCYTES # BLD AUTO: 0.51 X10*3/UL (ref 0.1–1)
MONOCYTES NFR BLD AUTO: 6.8 %
NEUTROPHILS # BLD AUTO: 3.82 X10*3/UL (ref 1.2–7.7)
NEUTROPHILS NFR BLD AUTO: 50.9 %
NON HDL CHOLESTEROL: 145 MG/DL (ref 0–149)
NRBC BLD-RTO: 0 /100 WBCS (ref 0–0)
PLATELET # BLD AUTO: 349 X10*3/UL (ref 150–450)
POTASSIUM SERPL-SCNC: 4 MMOL/L (ref 3.5–5.3)
PROT SERPL-MCNC: 6.5 G/DL (ref 6.4–8.2)
RBC # BLD AUTO: 4.47 X10*6/UL (ref 4–5.2)
SODIUM SERPL-SCNC: 138 MMOL/L (ref 136–145)
TRIGL SERPL-MCNC: 93 MG/DL (ref 0–149)
TSH SERPL-ACNC: 1.22 MIU/L (ref 0.44–3.98)
VLDL: 19 MG/DL (ref 0–40)
WBC # BLD AUTO: 7.5 X10*3/UL (ref 4.4–11.3)

## 2024-04-03 PROCEDURE — 36415 COLL VENOUS BLD VENIPUNCTURE: CPT

## 2024-04-03 PROCEDURE — 84443 ASSAY THYROID STIM HORMONE: CPT

## 2024-04-03 PROCEDURE — 85025 COMPLETE CBC W/AUTO DIFF WBC: CPT

## 2024-04-03 PROCEDURE — 83036 HEMOGLOBIN GLYCOSYLATED A1C: CPT

## 2024-04-03 PROCEDURE — 80061 LIPID PANEL: CPT

## 2024-04-03 PROCEDURE — 83001 ASSAY OF GONADOTROPIN (FSH): CPT

## 2024-04-03 PROCEDURE — 80053 COMPREHEN METABOLIC PANEL: CPT

## 2024-04-03 PROCEDURE — 82306 VITAMIN D 25 HYDROXY: CPT

## 2024-04-09 NOTE — PROGRESS NOTES
"Subjective     Patient ID: Abril Gregory is a 53 y.o. female who presents for No chief complaint on file..  HPI    53F here for preventive care visit, last seen in November.    6/23: can't schedule sleep study.   4/24: prediabetes resolved, lipids I'm;proved to 120s, FSH no menopause otherwise labs good.       - - Meniere's disease many years - has reduced her salt intake significantly, vertiginous symptoms precipitated by intense exercise, ringing in her right ear, has had hearing loss on the left, uses flonase and afrin as needed, also azelastine . She was seen by ENT in November concern for ear complaints found to have cerumen impaction status post removal, there was concern regarding progression of hearing loss recommended follow-up and CT of the internal auditory canal, medicine cream for itching. CT not yet done, has visit scheduled next week.     For the last few years has experienced stabbing discomfort in the RUQ comes and goes, lasts an hour or so, feels as if an abdominal migraine is coming on but specific to the RUQ. No radiation, no association with food, no nausea/vomiting, no changes in bowel movements. Cannot recall onset. Associated with diffuse pinprick sensation, isolated locally to that region.   - Vaginal itching no discharge     PMHx:  - Migraine headaches with possible abdominal migrainaous features -with alarm features waking her up at night and progressively worsening ordered for MRI not yet obtained.  She was prescribed sumatriptan and Zofran and hyoscyamine. She notes that these types of symptoms have been the same since her childhood and has had history of waking her up at night since her 20s, and elected not to pursue this. Continues to get monthly migraines associated with ovulation. Headaches begin with chills and \"pinpricks all over\" then \"I am an accordion being squeezed\". When taking hyoscyamine and other medications symptoms did improve.   - Thoracic back pain - s/p T3/4 spinal " fusion with history of radiculopathy - resolved on nightly gabapentin  - Prediabetes A1c 5.8% now resolved.   - Breast cancer risk - followed by breast surgery gets yearly MRI in addition to mammograms, declines prophylactic tamoxifen out of concern for increasing vasomotor symptoms. Has not had MRI in some time, viist scheduled in September, hesitant regarding FAST MRI.  - Perimenopause with vasomotor symptoms treated supportively  - Anxiety and depression - on wellbutrin stable  - ADHD - followed by psychiatry at [facility] as well as therapist  - on vyvanse and Wellbutrin last seen by Dr. Echeverria last month  - Prediabetes-lifestyle modification  - HLD +  with early family history of MI (father), CAC 2021 0, looks into insurance reagarding LP(a)  - B[facility] newly diagnosed [facility] lisdex[facility] (vyvanse) 30mg, lost 8 pounds since last seen in November.   - Cataracts pending surgery in the summer.  - Snoring - HSAT pending to be done.  - Eczema     Social:  - Previously a daily marijuana smoker for the past several years, quit 4 months as ago  has had some difficulty sleeping since, started melatonin a few days ago. still problems falling asleep.   -  at [employer], [employer]  - No tobacco, occasional alcohol  - Lives at home with two cats   - Mom and brother and cousins live around.     Lifestyle   - Diet - eating less since starting the vyvanse, just less. Tries to eat healthy, not much meat. Vegetables. Lots of stir fries   - Exercise - Peloton and long walks, some hiking.  - Sleep - difficulty pending HSAT to be performed. Sleeps with marijuana use, tried melatonin and multiple sleep supplements.     Review of Systems:   General: No constitutional symptoms, 8 pound weight loss since last being seen.   HEENT: No headaches, changes in vision or hearing, no sinus or dental issues   Cardiac: No chest pain, palpitations,  dyspnea on exertion   Lungs: No cough, wheezing, shortness of breath   Abdomen: as described   : No urinary complaints   Musculoskeletal: no joint pains, swelling or tenderness   Heme: No bleeding or thrombosis issues   Lymph: No swollen lymph glands   Skin: eczematous lesions   Psych: as described     Objective       Current Outpatient Medications:     azelastine (Astelin) 137 mcg (0.1 %) nasal spray, USE 2 SPRAYS IN EACH NOSTRIL TWICE DAILY, Disp: 30 mL, Rfl: 2    buPROPion XL (Wellbutrin XL) 300 mg 24 hr tablet, Take 1 tablet (300 mg) by mouth once daily., Disp: , Rfl:     cholecalciferol (Vitamin D-3) 50 mcg (2,000 unit) capsule, Take 1 capsule (50 mcg) by mouth early in the morning.., Disp: , Rfl:     gabapentin (Neurontin) 300 mg capsule, Take 1 capsule (300 mg) by mouth 2 times a day., Disp: 180 capsule, Rfl: 1    hyoscyamine 0.125 mg disintegrating tablet, Place 1 tablet (0.125 mg) under the tongue every 4 hours if needed (PRN)., Disp: 30 tablet, Rfl: 0    hyoscyamine 0.125 mg disintegrating tablet, DISSOLVE 1 TABLET(0.125 MG) UNDER THE TONGUE EVERY 4 HOURS AS NEEDED, Disp: 30 tablet, Rfl: 0    lisdexamfetamine (Vyvanse) 30 mg capsule, Take 1 capsule (30 mg) by mouth once daily in the morning., Disp: , Rfl:     mometasone (Elocon) 0.1 % cream, Apply thin film to itching/flaking skin of outer ears twice daily x 7 days then use as needed., Disp: 15 g, Rfl: 1    multivitamin-min-iron-FA-vit K (Adults Multivitamin) 18 mg iron-400 mcg-25 mcg tablet, Take by mouth., Disp: , Rfl:     ondansetron (Zofran) 8 mg tablet, Take by mouth., Disp: , Rfl:     SUMAtriptan (Imitrex) 20 mg/actuation nasal spray, 20 mg as a single dose in 1 nostril; if symptoms persist or return, may repeat dose after =2 hours., Disp: 1 each, Rfl: 0    SUMAtriptan (Imitrex) 50 mg tablet, Take 1 tablet (50 mg) by mouth 1 time if needed for migraine., Disp: 9 tablet, Rfl: 3      There were no vitals taken for this visit.      Physical  Examination:   General: Awake, alert, appears stated age   Head/eyes/ears: NCAT, EOMI, PERRL, TM WNL, no cerumen  Throat: moist mucus membranes, no pharyngeal erythema  Neck: Supple, nontender, no lymphadenopathy, thyroid exam unremarkable   Breast exam:   Heart: RRR, no murmurs, rubs or gallops  Lungs: CTA bilaterally, no rhonchi rales or wheezes   Abdomen: Soft, NT/ND  Extremities: No edema, 2+ DP pulses   Skin: No concerning skin lesions on visualized skin   Neuro: AAO x 3, no FND, gait unremarkable    Assessment/Plan   Problem List Items Addressed This Visit    None      Adult Health Examination  Age appropriate screening performed   Healthy lifestyle reviewed.   Depression screen   No additional pertinent family history or toxic habits   No high risk sexual behavior,   Cancer screening:   - Colonoscopy 11/20, 4mm polyp repeat 5 years, tubular adenoma  - Mammogram 9/23 pending   - Pap smear n/a (hysterectomy)   - Skin cancer prevention strategies reviewed - seen by dermatologist  Immunizations   Dental and visual examinations   Discussed adequate Vitamin D intake

## 2024-04-10 ENCOUNTER — OFFICE VISIT (OUTPATIENT)
Dept: PRIMARY CARE | Facility: CLINIC | Age: 54
End: 2024-04-10
Payer: COMMERCIAL

## 2024-04-10 VITALS
TEMPERATURE: 97.1 F | HEART RATE: 74 BPM | WEIGHT: 162 LBS | BODY MASS INDEX: 31.8 KG/M2 | DIASTOLIC BLOOD PRESSURE: 67 MMHG | HEIGHT: 60 IN | SYSTOLIC BLOOD PRESSURE: 113 MMHG

## 2024-04-10 DIAGNOSIS — Z00.00 ENCOUNTER FOR ROUTINE ADULT HEALTH EXAMINATION WITHOUT ABNORMAL FINDINGS: ICD-10-CM

## 2024-04-10 DIAGNOSIS — E78.00 HYPERCHOLESTEREMIA: ICD-10-CM

## 2024-04-10 DIAGNOSIS — R10.11 COLICKY RIGHT UPPER QUADRANT PAIN: Primary | ICD-10-CM

## 2024-04-10 DIAGNOSIS — K60.2 ANAL FISSURE: ICD-10-CM

## 2024-04-10 DIAGNOSIS — K60.3 ANAL FISSURE AND FISTULA: ICD-10-CM

## 2024-04-10 DIAGNOSIS — F90.9 ATTENTION DEFICIT HYPERACTIVITY DISORDER (ADHD), UNSPECIFIED ADHD TYPE: ICD-10-CM

## 2024-04-10 DIAGNOSIS — H81.09 MENIERE'S DISEASE, UNSPECIFIED LATERALITY: ICD-10-CM

## 2024-04-10 DIAGNOSIS — G47.09 OTHER INSOMNIA: ICD-10-CM

## 2024-04-10 DIAGNOSIS — G43.809 OTHER MIGRAINE WITHOUT STATUS MIGRAINOSUS, NOT INTRACTABLE: ICD-10-CM

## 2024-04-10 DIAGNOSIS — K60.2 ANAL FISSURE AND FISTULA: ICD-10-CM

## 2024-04-10 DIAGNOSIS — F41.8 DEPRESSION WITH ANXIETY: ICD-10-CM

## 2024-04-10 PROBLEM — R73.03 PREDIABETES: Status: RESOLVED | Noted: 2023-04-19 | Resolved: 2024-04-10

## 2024-04-10 PROBLEM — N89.8 ITCHING IN THE VAGINAL AREA: Status: RESOLVED | Noted: 2023-04-19 | Resolved: 2024-04-10

## 2024-04-10 PROBLEM — R73.9 HYPERGLYCEMIA: Status: RESOLVED | Noted: 2023-02-20 | Resolved: 2024-04-10

## 2024-04-10 PROCEDURE — 1036F TOBACCO NON-USER: CPT | Performed by: INTERNAL MEDICINE

## 2024-04-10 PROCEDURE — 99396 PREV VISIT EST AGE 40-64: CPT | Performed by: INTERNAL MEDICINE

## 2024-04-10 RX ORDER — NITROGLYCERIN 4 MG/G
1 OINTMENT RECTAL EVERY 12 HOURS SCHEDULED
Qty: 30 G | Refills: 0 | Status: SHIPPED | OUTPATIENT
Start: 2024-04-10

## 2024-04-10 RX ORDER — LIDOCAINE HYDROCHLORIDE 20 MG/ML
1 JELLY TOPICAL ONCE
Qty: 20 ML | Refills: 0 | Status: SHIPPED | OUTPATIENT
Start: 2024-04-10 | End: 2024-04-10

## 2024-04-10 NOTE — PATIENT INSTRUCTIONS
Abril,   Right upper quadrant pain - Ultrasound has been ordered.  Stop by the first floor in the imaging department to have the scheduled or call the imaging department.    Here are some CBT-I resources:   CBT-I at  - Ashli Carmela, NP   GoToSleep- online course via [facility]. Self-guided. One time charge to sign up:     [email or web address]    SleepEZ- Free self-guided course from the [facility] https://www.veterantraining.[place].gov[place]insomnia/. Also free Deanne called CBTi     Dr. Kendrick - one on one CBT-I service [email or web address]  4.   Dermatology Referral - I recommend Dr. Nikki Buckley.   5.   COVID booster at the pharmacy   6.   Migraine - try magnesium oxide and riboflavin 400mg daily. There is also a blend called Migralief.   7.   Rectal creams prescribed - recommend sitz bath, increasing fiber as well. Let me know if symptoms persist.     Followup 1 year

## 2024-04-10 NOTE — PROGRESS NOTES
"Subjective     Patient ID: Abril Gregory is a 53 y.o. female who presents for Annual Exam (Pt present today for physical. ls).  HPI    53F here for preventive care visit, last seen in November.    6/23: can't schedule sleep study.   4/24: prediabetes resolved, lipids I'm;proved to 120s, FSH no menopause otherwise labs good.       - Meniere's disease many years - has reduced her salt intake significantly, vertiginous symptoms precipitated by intense exercise, ringing in her right ear, has had hearing loss on the left, uses flonase and afrin as needed, also azelastine . She was seen by ENT in November concern for ear complaints found to have cerumen impaction status post removal, there was concern regarding progression of hearing loss recommended follow-up and CT of the internal auditory canal, medicine cream for itching. CT not yet done, has visit scheduled next week.     For the last few years has experienced stabbing discomfort in the RUQ comes and goes, lasts an hour or so, feels as if an abdominal migraine is coming on but specific to the RUQ. No radiation, no association with food, no nausea/vomiting, no changes in bowel movements. Cannot recall onset. Associated with diffuse pinprick sensation, isolated locally to that region.   - Upper vaginal and anal itching longstanding.    PMHx:  - Migraine headaches with possible abdominal migrainaous features -with alarm features waking her up at night and progressively worsening ordered for MRI not yet obtained.  She was prescribed sumatriptan and Zofran and hyoscyamine. She notes that these types of symptoms have been the same since her childhood and has had history of waking her up at night since her 20s, and elected not to pursue this. Continues to get monthly migraines associated with ovulation. Headaches begin with chills and \"pinpricks all over\" then \"I am an accordion being squeezed\". When taking hyoscyamine and other medications symptoms did improve.   - Thoracic " Head, normocephalic, atraumatic, Face, Face within normal limits, External ears within normal limits, External nose  normal appearance back pain - s/p T3/4 spinal fusion with history of radiculopathy - resolved on nightly gabapentin  - Prediabetes A1c 5.8% now resolved.   - Breast cancer risk - followed by breast surgery gets yearly MRI in addition to mammograms, declines prophylactic tamoxifen out of concern for increasing vasomotor symptoms. Has not had MRI in some time, viist scheduled in September, hesitant regarding FAST MRI.  - Perimenopause with vasomotor symptoms treated supportively  - Anxiety and depression - on wellbutrin stable  - ADHD - followed by psychiatry at [facility] as well as therapist  - on vyvanse and Wellbutrin last seen by Dr. Echeverria last month  - Prediabetes-lifestyle modification  - HLD +  with early family history of MI (father), CAC 2021 0, looks into insurance reagarding LP(a)  - [facility] lisdex[facility] (vyvanse) 30mg, lost 8 pounds since last seen in November.   - Cataracts pending surgery in the summer.  - Snoring - HSAT pending to be done.  - Eczema     Social:  - Previously a daily marijuana smoker for the past several years, quit 4 months as ago  has had some difficulty sleeping since, started melatonin a few days ago. still problems falling asleep.   -  at [employer]  - No tobacco, occasional alcohol  - Lives at home with two cats   - Mom and brother and cousins live around.     Lifestyle   - Diet - eating less since starting the vyvanse, just less. Tries to eat healthy, not much meat. Vegetables. Lots of stir fries   - Exercise - Peloton and long walks, some hiking.  - Sleep - difficulty pending HSAT to be performed. Sleeps with marijuana use, tried melatonin and multiple sleep supplements.     Review of Systems:   General: No constitutional symptoms, 8 pound weight loss since last being seen.   HEENT: No headaches, changes in vision or hearing, no sinus or dental issues   Cardiac: No  chest pain, palpitations, dyspnea on exertion   Lungs: No cough, wheezing, shortness of breath   Abdomen: as described   : No urinary complaints   Musculoskeletal: no joint pains, swelling or tenderness   Heme: No bleeding or thrombosis issues   Lymph: No swollen lymph glands   Skin: eczematous lesions and anal itching as described   Psych: as described     Objective       Current Outpatient Medications:     azelastine (Astelin) 137 mcg (0.1 %) nasal spray, USE 2 SPRAYS IN EACH NOSTRIL TWICE DAILY, Disp: 30 mL, Rfl: 2    buPROPion XL (Wellbutrin XL) 300 mg 24 hr tablet, Take 1 tablet (300 mg) by mouth once daily., Disp: , Rfl:     cholecalciferol (Vitamin D-3) 50 mcg (2,000 unit) capsule, Take 1 capsule (50 mcg) by mouth early in the morning.., Disp: , Rfl:     gabapentin (Neurontin) 300 mg capsule, Take 1 capsule (300 mg) by mouth 2 times a day., Disp: 180 capsule, Rfl: 1    hyoscyamine 0.125 mg disintegrating tablet, Place 1 tablet (0.125 mg) under the tongue every 4 hours if needed (PRN)., Disp: 30 tablet, Rfl: 0    hyoscyamine 0.125 mg disintegrating tablet, DISSOLVE 1 TABLET(0.125 MG) UNDER THE TONGUE EVERY 4 HOURS AS NEEDED, Disp: 30 tablet, Rfl: 0    lidocaine 2 % mucosal jelly (Uro-Jet), Insert 5 mL (1 Application) into the urethra 1 time for 1 dose., Disp: 20 mL, Rfl: 0    lisdexamfetamine (Vyvanse) 30 mg capsule, Take 1 capsule (30 mg) by mouth once daily in the morning., Disp: , Rfl:     mometasone (Elocon) 0.1 % cream, Apply thin film to itching/flaking skin of outer ears twice daily x 7 days then use as needed., Disp: 15 g, Rfl: 1    multivitamin-min-iron-FA-vit K (Adults Multivitamin) 18 mg iron-400 mcg-25 mcg tablet, Take by mouth., Disp: , Rfl:     nitroglycerin (Rectiv) 0.4 % (w/w) rectal ointment, Insert 1 Application into the rectum every 12 hours., Disp: 30 g, Rfl: 0    ondansetron (Zofran) 8 mg tablet, Take by mouth., Disp: , Rfl:     SUMAtriptan (Imitrex) 20 mg/actuation nasal spray, 20 mg as  a single dose in 1 nostril; if symptoms persist or return, may repeat dose after =2 hours., Disp: 1 each, Rfl: 0    SUMAtriptan (Imitrex) 50 mg tablet, Take 1 tablet (50 mg) by mouth 1 time if needed for migraine., Disp: 9 tablet, Rfl: 3      /67   Pulse 74   Temp 36.2 °C (97.1 °F)   Ht 1.524 m (5')   Wt 73.5 kg (162 lb)   BMI 31.64 kg/m²       Physical Examination:   General: Awake, alert, appears stated age   Head/eyes/ears: NCAT, EOMI, PERRL, TM WNL, no cerumen  Throat: moist mucus membranes, no pharyngeal erythema  Neck: Supple, nontender, no lymphadenopathy, thyroid exam unremarkable   Breast exam: deferred  Heart: RRR, no murmurs, rubs or gallops  Lungs: CTA bilaterally, no rhonchi rales or wheezes   Abdomen: Soft, NT/ND  Extremities: No edema, 2+ DP pulses   Skin: No concerning skin lesions on visualized skin   Neuro: AAO x 3, no FND, gait unremarkable  Anal exam: Vulva and external genitalia no concerning lesions or skin changes appreciated, rectum with anterior chronic appearing fissure without exudate or concerning discharge.  No overlying erythema    Assessment/Plan   Problem List Items Addressed This Visit          Adult Health Examination  Age appropriate screening performed   Healthy lifestyle reviewed.   Depression screen   No additional pertinent family history or toxic habits   No high risk sexual behavior, declines STI screen  Cancer screening:   - Colonoscopy 11/20, 4mm polyp repeat 5 years, tubular adenoma  - Mammogram 9/23 pending   - Pap smear n/a (hysterectomy)   - Skin cancer prevention strategies reviewed - seen by dermatologist interested in alternate dermatologist  Immunizations : Flu UTD. COVID booster due, shingrix due, Hep B series UTD, Tdap UTD   Dental and visual examinations UTD   Discussed adequate Vitamin D intake      Depression with anxiety     Followed by psychiatry stable on wellbutrin,   Adequate followup with both psychiatry and therapy.         Headache, migraine      Onset during ovulation, not clinically menopausal yet.   Declines medical management for preventive measures,  Advised trial of magnesium and riboflavin         Hypercholesteremia     With significant family history, CAC 0, lipids improved.   Declines further restratification at present  Will continue lifestyle modifications encouragement provided.         Meniere's disease     Upcoming appointment scheduled with ENT for further discussion, declined repeat of IAC.         ADHD     maintained on Vyvanse with improvement in symptoms as well as appetite.  Followed by psychiatry         Other insomnia     Pending HSAT to be performed  Trial of magnesium prior to bed  CBT-I options provided.          Other Visit Diagnoses       Colicky right upper quadrant pain    -  Primary  With uncertain etiology, known hemangioma.  Obtain right upper quadrant ultrasound for further characterization.  Possible biliary colic.    Relevant Orders    US right upper quadrant        Anal fissure        Relevant Medications    nitroglycerin (Rectiv) 0.4 % (w/w) rectal ointment    lidocaine 2 % mucosal jelly (Uro-Jet)    Encounter for routine adult health examination without abnormal findings        Relevant Orders    Follow Up In [facility] - PCP - Health Maintenance            Followup 1 year or as needed

## 2024-04-10 NOTE — ASSESSMENT & PLAN NOTE
Onset during ovulation, not clinically menopausal yet.   Declines medical management for preventive measures,  Advised trial of magnesium and riboflavin

## 2024-04-10 NOTE — ASSESSMENT & PLAN NOTE
Followed by psychiatry stable on wellbutrin,   Adequate followup with both psychiatry and therapy.

## 2024-04-10 NOTE — ASSESSMENT & PLAN NOTE
With significant family history, CAC 0, lipids improved.   Declines further restratification at present  Will continue lifestyle modifications encouragement provided.

## 2024-04-11 ENCOUNTER — CLINICAL SUPPORT (OUTPATIENT)
Dept: SLEEP MEDICINE | Facility: HOSPITAL | Age: 54
End: 2024-04-11
Payer: COMMERCIAL

## 2024-04-11 DIAGNOSIS — G47.33 OBSTRUCTIVE SLEEP APNEA (ADULT) (PEDIATRIC): ICD-10-CM

## 2024-04-11 PROCEDURE — 95806 SLEEP STUDY UNATT&RESP EFFT: CPT | Performed by: INTERNAL MEDICINE

## 2024-04-22 ENCOUNTER — APPOINTMENT (OUTPATIENT)
Dept: OTOLARYNGOLOGY | Facility: CLINIC | Age: 54
End: 2024-04-22
Payer: COMMERCIAL

## 2024-04-24 DIAGNOSIS — G47.33 OSA (OBSTRUCTIVE SLEEP APNEA): Primary | ICD-10-CM

## 2024-04-29 ENCOUNTER — HOSPITAL ENCOUNTER (OUTPATIENT)
Dept: RADIOLOGY | Facility: CLINIC | Age: 54
Discharge: HOME | End: 2024-04-29
Payer: COMMERCIAL

## 2024-04-29 DIAGNOSIS — R10.11 COLICKY RIGHT UPPER QUADRANT PAIN: ICD-10-CM

## 2024-04-29 PROCEDURE — 76705 ECHO EXAM OF ABDOMEN: CPT

## 2024-04-29 PROCEDURE — 76705 ECHO EXAM OF ABDOMEN: CPT | Performed by: RADIOLOGY

## 2024-04-30 DIAGNOSIS — R16.0 LIVER MASS: Primary | ICD-10-CM

## 2024-05-06 ENCOUNTER — OFFICE VISIT (OUTPATIENT)
Dept: OTOLARYNGOLOGY | Facility: CLINIC | Age: 54
End: 2024-05-06
Payer: COMMERCIAL

## 2024-05-06 VITALS — BODY MASS INDEX: 32.04 KG/M2 | WEIGHT: 163.2 LBS | HEIGHT: 60 IN | TEMPERATURE: 97.8 F

## 2024-05-06 DIAGNOSIS — H69.93 DYSFUNCTION OF BOTH EUSTACHIAN TUBES: ICD-10-CM

## 2024-05-06 DIAGNOSIS — H93.8X3 SENSATION OF FULLNESS IN BOTH EARS: Primary | ICD-10-CM

## 2024-05-06 DIAGNOSIS — H65.22 LEFT CHRONIC SEROUS OTITIS MEDIA: ICD-10-CM

## 2024-05-06 DIAGNOSIS — H90.6 MIXED CONDUCTIVE AND SENSORINEURAL HEARING LOSS OF BOTH EARS: ICD-10-CM

## 2024-05-06 PROCEDURE — 99213 OFFICE O/P EST LOW 20 MIN: CPT | Performed by: NURSE PRACTITIONER

## 2024-05-06 PROCEDURE — 1036F TOBACCO NON-USER: CPT | Performed by: NURSE PRACTITIONER

## 2024-05-06 RX ORDER — FLUTICASONE PROPIONATE 50 MCG
1 SPRAY, SUSPENSION (ML) NASAL 2 TIMES DAILY
Qty: 16 G | Refills: 11 | Status: SHIPPED | OUTPATIENT
Start: 2024-05-06 | End: 2025-05-06

## 2024-05-06 ASSESSMENT — PATIENT HEALTH QUESTIONNAIRE - PHQ9
1. LITTLE INTEREST OR PLEASURE IN DOING THINGS: NOT AT ALL
2. FEELING DOWN, DEPRESSED OR HOPELESS: NOT AT ALL
SUM OF ALL RESPONSES TO PHQ9 QUESTIONS 1 AND 2: 0

## 2024-05-06 NOTE — PROGRESS NOTES
Subjective   Patient ID: Abril Gregory is a 53 y.o. female who presents for Follow-up (6 month).  HPI  This patient presents for a 6-month follow-up visit.  In review, she has a history of bilateral mixed hearing loss, left tympanomastoidectomy, bilateral PE tubes, possible right tympanoplasty.  Surgeries were done as a child while living in [place].  Patient also reported being diagnosed with Ménière's disease in her 30s while living in [facility].  She recently had COVID-19 for the first time.  She reports some dizzy spells with that which also included fainting.  She denies having any vertigo.  Today, she also complains of left greater than right fullness and worsening of her hearing loss.  At her last visit, I recommended CT IAC and following up with one of my surgical partners.  Patient was not able to have the CT scan done due to high out-of-pocket cost.  Review of Systems  A comprehensive or 10 points review of the patient´s constitutional, neurological, HEENT, pulmonary, cardiovascular and genito-urinary systems showed only those mentioned in history of present illness.    Objective   Physical Exam  Constitutional: no fever, chills, weight loss or weight gain   General appearance: Appears well, well-nourished, well groomed. No acute distress.   Communication: Normal communication   Psychiatric: Oriented to person, place and time. Normal mood and affect.   Neurologic: Cranial nerves II-XII grossly intact and symmetric bilaterally.   Head and Face:   Head: Atraumatic with no masses, lesions or scarring.   Face: Normal symmetry, no paralysis, synkinesis or facial tic. No scars or deformities.     Eyes: Conjunctiva not edematous or erythematous   Ears: External inspection of ears with no deformity, scars or masses.  Right TM with central/inferior retraction but no effusion noted.  This is stable when compared to my previous note.  Left TM with inferior anterior retraction with serous effusion.  This is new when  compared to previous exam.     Neck: Normal appearing, symmetric, trachea midline.   Cardiovascular: Examination of peripheral vascular system shows no clubbing or cyanosis.   Respiratory: No respiratory distress increased work of breathing. Inspection of the chest with symmetric chest expansion and normal respiratory effort.   Skin: No rashes in the head or neck    Assessment/Plan     This patient presents for subsequent evaluation of acute acquired bilateral aural fullness, left COM as well as chronic bilateral eustachian tube dysfunction and bilateral mixed hearing loss.    Reassurance given that there is no sign of infection on exam.  I recommended continuing her azelastine and adding Flonase.  Patient reports that the last time she used Afrin she had copious rhinorrhea.  She does not want to use this again.  Her last that our office contact her to be scheduled with one of my surgical partners.  Patient is requesting a female provider.  All questions were answered to patient's satisfaction.    This note was created using speech recognition transcription software. Despite proofreading, several typographical errors might be present that might affect the meaning of the content. Please call with any questions.       IGNACIO Collado-CNP 05/06/24 4:03 PM

## 2024-05-08 ENCOUNTER — APPOINTMENT (OUTPATIENT)
Dept: RADIOLOGY | Facility: CLINIC | Age: 54
End: 2024-05-08
Payer: COMMERCIAL

## 2024-05-16 ENCOUNTER — TELEMEDICINE (OUTPATIENT)
Dept: PRIMARY CARE | Facility: CLINIC | Age: 54
End: 2024-05-16
Payer: COMMERCIAL

## 2024-05-16 DIAGNOSIS — R55 SYNCOPE, UNSPECIFIED SYNCOPE TYPE: ICD-10-CM

## 2024-05-16 DIAGNOSIS — F41.8 SITUATIONAL ANXIETY: Primary | ICD-10-CM

## 2024-05-16 PROCEDURE — 99214 OFFICE O/P EST MOD 30 MIN: CPT | Performed by: INTERNAL MEDICINE

## 2024-05-16 PROCEDURE — 1036F TOBACCO NON-USER: CPT | Performed by: INTERNAL MEDICINE

## 2024-05-16 RX ORDER — LORAZEPAM 0.5 MG/1
.5-1 TABLET ORAL SEE ADMIN INSTRUCTIONS
Qty: 4 TABLET | Refills: 0 | Status: SHIPPED | OUTPATIENT
Start: 2024-05-16 | End: 2024-06-15

## 2024-05-16 NOTE — PROGRESS NOTES
"Subjective   Patient ID: Abril Gregory is a 53 y.o. female who presents for No chief complaint on file..  HPI  53-year-old female here for follow-up visit, last seen in April for preventive care visit.    4/24: severe yolanda refer to sleep medicine.  Hemangioma enlarging get mri and refer to hepatology.    Has intense fear of needles particularly with having an IV laced in her arm, less of an issue with blood draws, has been on valium before without significant side effects.   A few weeks ago post COVID, felt dizzy and lightheaded with prodromal symptom, went to go shut the front door and subsequently woke up by the front door, not disoriented when waking up, no tongue biting or urinary incontinence. Then went to go to bed and then again woke up on her bedroom floor. Again no postictal state, no tongue biting, no incontinence, though at that time no known prodromal state. No head injury, woke up as if she had laid down on the floor without injury though cannot fully characterize. Never happened before and has not happened since. No palpitations prior.     PMHx:  - Migraine headaches with possible abdominal migrainaous features -with alarm features waking her up at night and progressively worsening ordered for MRI not yet obtained.  She was prescribed sumatriptan and Zofran and hyoscyamine. She notes that these types of symptoms have been the same since her childhood and has had history of waking her up at night since her 20s, and elected not to pursue this. Continues to get monthly migraines associated with ovulation. Headaches begin with chills and \"pinpricks all over\" then \"I am an accordion being squeezed\". When taking hyoscyamine and other medications symptoms did improve.  Advised trial of magnesium and riboflavin.  - Thoracic back pain - s/p T3/4 spinal fusion with history of radiculopathy - resolved on nightly gabapentin  - Prediabetes A1c 5.8% now resolved.  -Ménière's disease-followed by ENT  - Breast cancer " risk - followed by breast surgery gets yearly MRI in addition to mammograms, declines prophylactic tamoxifen out of concern for increasing vasomotor symptoms. Has not had MRI in some time, viist scheduled in September, hesitant regarding FAST MRI.  - Perimenopause with vasomotor symptoms treated supportively  - Anxiety and depression - on wellbutrin stable followed by psychiatry  - ADHD - followed by psychiatry at [facility] as well as therapist  - on vyvanse and Wellbutrin last seen by Dr. Echeverria last month  - Prediabetes-lifestyle modification  - HLD +  with early family history of MI (father), CAC 2021 0  - Binge eating disorder newly diagnosed [facility] lisdex[facility]fetamine (vyvanse) 30mg, lost 8 pounds since last seen in November.   - Cataracts pending surgery in the summer.  - Snoring - HSAT pending to be done.  Given CBT-I options  - Eczema      Social:  - Previously a daily marijuana smoker for the past several years, quit 4 months as ago  has had some difficulty sleeping since, started melatonin a few days ago. still problems falling asleep.   -  at [employer]  - No tobacco, occasional alcohol  - Lives at home with two cats   - Mom and brother and cousins live around.     Current Outpatient Medications   Medication Instructions    azelastine (Astelin) 137 mcg (0.1 %) nasal spray 2 sprays, Each Nostril, 2 times daily    buPROPion XL (Wellbutrin XL) 300 mg 24 hr tablet 1 tablet, oral, Daily    cholecalciferol (VITAMIN D-3) 50 mcg, oral, Daily    fluticasone (Flonase) 50 mcg/actuation nasal spray 1 spray, Each Nostril, 2 times daily, Shake gently. Before first use, prime pump. After use, clean tip and replace cap.    gabapentin (NEURONTIN) 300 mg, oral, 2 times daily    hyoscyamine (ANASPAZ) 0.125 mg, sublingual, Every 4 hours PRN    hyoscyamine 0.125 mg disintegrating tablet DISSOLVE 1 TABLET(0.125 MG) UNDER THE TONGUE EVERY 4  HOURS AS NEEDED    lisdexamfetamine (VYVANSE) 30 mg, oral, Every morning    LORazepam (ATIVAN) 0.5-1 mg, oral, See admin instructions, Take 30 minutes prior to procedure.    mometasone (Elocon) 0.1 % cream Apply thin film to itching/flaking skin of outer ears twice daily x 7 days then use as needed.    multivitamin-min-iron-FA-vit K (Adults Multivitamin) 18 mg iron-400 mcg-25 mcg tablet oral    nitroglycerin (Rectiv) 0.4 % (w/w) rectal ointment 1 Application, rectal, Every 12 hours scheduled    ondansetron (Zofran) 8 mg tablet oral    SUMAtriptan (Imitrex) 20 mg/actuation nasal spray 20 mg as a single dose in 1 nostril; if symptoms persist or return, may repeat dose after =2 hours.    SUMAtriptan (IMITREX) 50 mg, oral, Once as needed        Objective     There were no vitals taken for this visit.    Physical Exam  General: Alert and oriented, in no apparent distress   HEENT: No conjunctival erythema, no external facial lesions   Lungs: Breathing comfortably  Skin: No evidence of skin breakdown.  Neuro: AAO x 3, answering questions appropriately, no obvious cranial nerve deficits     Assessment/Plan   Problem List Items Addressed This Visit    None  Visit Diagnoses       Situational anxiety    -  Primary  Extensively discussed with panic symptoms related to IV insertion.   Will rx benzo prior to procedure. Potential side effects and management expectations reviewed. Risks of benzo reviewed     Relevant Medications    LORazepam (Ativan) 0.5 mg tablet    Syncope, unspecified syncope type       Several weeks ago in the setting of COVID, symptoms suggestive of possible neurocardiogenic v orthostasis source without additional alarm symptoms and has not had recurrence.   Pending MRI brain will followup on those results.   If symptoms recur or are concerning will come in for further evaluation.         Liver lesions   -With evident enlargement of liver lesions suggestive of hemangioma though clinically  symptomatic.  Obtaining MRI and referring to hepatology        [facility] maintenance  Cancer screening:  -Colonoscopy 11/20, 4 mm polyp repeat 5 years, tubular adenoma  -Mammogram 9/23  -Pap smear N/A, hysterectomy  -Skin-follows with dermatologist

## 2024-05-21 ENCOUNTER — APPOINTMENT (OUTPATIENT)
Dept: RADIOLOGY | Facility: HOSPITAL | Age: 54
End: 2024-05-21
Payer: COMMERCIAL

## 2024-05-21 ENCOUNTER — HOSPITAL ENCOUNTER (OUTPATIENT)
Dept: RADIOLOGY | Facility: HOSPITAL | Age: 54
Discharge: HOME | End: 2024-05-21
Payer: COMMERCIAL

## 2024-05-21 DIAGNOSIS — R16.0 LIVER MASS: ICD-10-CM

## 2024-05-21 DIAGNOSIS — G43.809 OTHER MIGRAINE WITHOUT STATUS MIGRAINOSUS, NOT INTRACTABLE: ICD-10-CM

## 2024-05-21 PROCEDURE — 2550000001 HC RX 255 CONTRASTS: Performed by: INTERNAL MEDICINE

## 2024-05-21 PROCEDURE — 74183 MRI ABD W/O CNTR FLWD CNTR: CPT

## 2024-05-21 PROCEDURE — 70551 MRI BRAIN STEM W/O DYE: CPT

## 2024-05-21 PROCEDURE — 74183 MRI ABD W/O CNTR FLWD CNTR: CPT | Performed by: STUDENT IN AN ORGANIZED HEALTH CARE EDUCATION/TRAINING PROGRAM

## 2024-05-21 PROCEDURE — 70551 MRI BRAIN STEM W/O DYE: CPT | Performed by: RADIOLOGY

## 2024-05-21 PROCEDURE — A9575 INJ GADOTERATE MEGLUMI 0.1ML: HCPCS | Performed by: INTERNAL MEDICINE

## 2024-05-21 RX ORDER — GADOTERATE MEGLUMINE 376.9 MG/ML
15 INJECTION INTRAVENOUS
Status: COMPLETED | OUTPATIENT
Start: 2024-05-21 | End: 2024-05-21

## 2024-05-21 RX ADMIN — GADOTERATE MEGLUMINE 15 ML: 376.9 INJECTION INTRAVENOUS at 17:38

## 2024-05-24 DIAGNOSIS — M95.2 SKULL DEFECT: Primary | ICD-10-CM

## 2024-07-01 NOTE — PROGRESS NOTES
AUDIOLOGY ADULT AUDIOMETRIC EVALUATION      Name:  Abril Gregory   :  1970  Age:  53 y.o.  Date of Evaluation:  2024    Time: 7255-5848    IMPRESSIONS     Essentially mild mixed hearing loss in the right ear, and hearing essentially within normal limits sloping to severe mixed hearing loss in the left ear.  Word understanding in quiet is excellent in both ears.  Tympanometry indicates restricted eardrum mobility consistent with outer/middle ear involvement in the right ear, and negative middle ear pressure and normal eardrum mobility in the left ear.     Today's test results are hearing loss requiring medical/otologic and audiologic follow-up.     Amplification needs: Need for amplification will be reassessed following medical intervention.    RECOMMENDATIONS     Continue medical follow up with primary care provider and/or Ears Nose and Throat (ENT) provider as recommended.  Return for audiologic evaluation in conjunction with medical management or annually (whichever is sooner) to monitor hearing sensitivity and assess middle ear status or sooner should concerns arise. The audiology department can be reached at [phone] to schedule an appointment.   Avoid exposure to loud sounds by moving away from the noise, turning down the volume, or wearing proper hearing protection correctly.  Consider use of good communication strategies. These include but are not limited to the following: get Abril's attention before speaking to her, close the distance between Abril and who is speaking, limit background noise, allow Abril access to visual cues (i.e. facial expressions/mouth movements, pictures, written instructions, etc.). When in situations where background noise cannot be avoided, position yourself so that the background noise is to your back, and you communication partner is seated in front of you, ideally with a quiet area or wall behind them.     HISTORY     Reason for visit:  Ms. Gregory is seen today for a  follow-up audiologic evaluation in conjunction with an evaluation with Ginny Farr MD due to known bilateral mixed hearing loss in the right ear greater than the left ear. History obtained from patient report and chart review.     History from same day encounter in ENT: Abril Gregory is a 53 y.o. female whom presents to me as a new patient for evaluation of acute acquired bilateral aural fullness, left COM as well as chronic bilateral eustachian tube dysfunction and bilateral mixed hearing loss, referred here today by ANNA Collado. Several months ago, she had COVID, and her hearing has since gotten better. She works in a loud environment. She has a PMH of multiple surgeries in her left ear, although her primary symptoms for which she presents today are due to issues with her right ear. She was diagnosed with Meniere's disease when she was 31 while living in [facility], for which she was prescribed a course of betahistine for 3 months. Now, she rarely gets symptoms of Meniere's, though she occasionally gets vertigo when she exerts herself significantly.    Recall from previous encounter with Ilene Barr APRN.CNP in the Ears Nose and Throat (ENT) department on 5/6/2024: This patient presents for a 6-month follow-up visit. In review, she has a history of bilateral mixed hearing loss, left tympanomastoidectomy, bilateral PE tubes, possible right tympanoplasty. Surgeries were done as a child while living in [place]. Patient also reported being diagnosed with Ménière's disease in her 30s while living in [facility]. She recently had COVID-19 for the first time. She reports some dizzy spells with that which also included fainting. She denies having any vertigo. Today, she also complains of left greater than right fullness and worsening of her hearing loss. At her last visit, I recommended CT IAC and following up with one of my surgical partners. Patient was not able to have the CT scan done due to high out-of-pocket  "cost.     Previous audiometric testing performed on 11/29/2023 revealed are consistent with normal sloping to severe conductive hearing loss in the LE and a moderately-severe rising to mild and sloping again to moderate mixed hearing loss in the RE. Results show progression in thresholds when compared with most recent hearing evaluation performed on 07/01/2020. Discussed results and recommendations with patient.    Recall from previous encounter in the audiology department on 11/29/2023: Abril Gregory is seen today at the request of Ilene Barr CNP for an evaluation of hearing.  Patient has a known history of ear surgeries bilaterally in childhood (reportedly tympanoplasty and ossicular chain repair). Additionally, in the past she has reported during her 30s while living in [place] she was diagnosed with Meniere's Disease in LE with associated reduced hearing and dizziness. Today, she reported decrease in hearing with intermittent bilateral tinnitus. Previous history of vertigo/spinning. Patient denied history of aural fullness or excessive noise exposure. No history of hearing aid use.     EVALUATION     See scanned audiogram in \"Media\"     TEST RESULTS     Otoscopic Evaluation:  Right Ear: Ear canal clear.  Left Ear: Ear canal clear.    Tympanometry (226 Hz):  Right Ear: Type B, restricted eardrum mobility consistent with outer/middle ear involvement.   Left Ear: Type C, negative middle ear pressure (-150 daPa) and normal eardrum mobility.     Acoustic Reflexes:   Right Ear: Did not test due to type B immittance result.  Left Ear: Responses present at 500 Hz, and absent at 1517-2383 Hz.    Distortion Product Otoacoustic Emissions:  Right Ear: Did not test.  Left Ear:  Did not test.  Present OAEs suggest normal or near cochlear outer hair cell function for corresponding frequency region(s). Absent OAEs with normal middle ear function can be consistent with some degree of hearing loss. Assessment of cochlear outer " hair cell function may be impacted by outer or middle ear function.    Test technique:  Pure Tone Audiometry via insert earphones  Reliability:   good    Pure Tone Audiometry:    Right Ear: Mild mixed hearing loss for 125-6000 Hz, sloping to moderately-severe at 8000 Hz. Air-bone gap of 30 dB present at 1000 Hz.   Left Ear: Hearing sensitivity essentially within normal limits for 125-2000 Hz, sloping to mild to severe mixed hearing loss for 6577-9068 Hz. Air bone gaps of 20-25 dB present at 1000 and 4000 Hz.     Speech Audiometry:   Right Ear:  Speech Reception Threshold (SRT) was obtained at 25 dB HL. This is in good agreement with three frequency Pure Tone Average. Word Recognition scores were excellent (100%) in quiet when words were presented at 75 dB HL. These results are based on [facility] Auditory Test No.6 (NU-6) Ordered by difficulty (N=10) and contralateral masking was presented at 45 dB HL.   Left Ear:  Speech Reception Threshold (SRT) was obtained at 20 dB HL. This is in good agreement with three frequency Pure Tone Average. Word Recognition scores were excellent (100%) in quiet when words were presented at 60 dB HL. These results are based on [facility] Auditory Test No.6 (NU-6) Ordered by difficulty (N=10).     Comparison of today's results with previous test results: Essentially stable in the left ear, and improvement in the right ear since last evaluation on 11/29/2023.         PATIENT EDUCATION     Discussed results and recommendations with Ms. Gregory. Questions were addressed and the patient was encouraged to contact our department at [phone] should concerns arise.     Kimberly Rodriguez, KYLIE, CCC-A  Licensed Clinical Audiologist    Degree of   Hearing Sensitivity dB Range   Within Normal Limits (WNL) 0 - 20   Slight 25   Mild 26 - 40   Moderate 41 - 55   Moderately-Severe 56 - 70   Severe 71 - 90   Profound 91 +     Key   CHL Conductive Hearing Loss   ECV Ear Canal  Volume   HA Hearing Aid   NIHL Noise-Induced Hearing Loss   PTA Pure Tone Average   SNHL Sensorineural Hearing Loss   TM Tympanic Membrane   WNL Within Normal Limits

## 2024-07-05 ENCOUNTER — APPOINTMENT (OUTPATIENT)
Dept: OTOLARYNGOLOGY | Facility: CLINIC | Age: 54
End: 2024-07-05
Payer: COMMERCIAL

## 2024-07-05 ENCOUNTER — CLINICAL SUPPORT (OUTPATIENT)
Dept: AUDIOLOGY | Facility: HOSPITAL | Age: 54
End: 2024-07-05
Payer: COMMERCIAL

## 2024-07-05 VITALS — BODY MASS INDEX: 30.58 KG/M2 | HEIGHT: 61 IN | WEIGHT: 162 LBS

## 2024-07-05 DIAGNOSIS — H90.3 SENSORINEURAL HEARING LOSS (SNHL) OF BOTH EARS: ICD-10-CM

## 2024-07-05 DIAGNOSIS — H90.6 MIXED CONDUCTIVE AND SENSORINEURAL HEARING LOSS OF BOTH EARS: Primary | ICD-10-CM

## 2024-07-05 DIAGNOSIS — H69.92 DYSFUNCTION OF LEFT EUSTACHIAN TUBE: Primary | ICD-10-CM

## 2024-07-05 DIAGNOSIS — H69.93 DYSFUNCTION OF BOTH EUSTACHIAN TUBES: ICD-10-CM

## 2024-07-05 PROCEDURE — 99214 OFFICE O/P EST MOD 30 MIN: CPT | Performed by: OTOLARYNGOLOGY

## 2024-07-05 PROCEDURE — 92504 EAR MICROSCOPY EXAMINATION: CPT | Performed by: OTOLARYNGOLOGY

## 2024-07-05 PROCEDURE — 92557 COMPREHENSIVE HEARING TEST: CPT

## 2024-07-05 PROCEDURE — 92567 TYMPANOMETRY: CPT

## 2024-07-05 NOTE — PROGRESS NOTES
History Of Present Illness:  Abril Gregory is a 53 y.o. female whom presents to me as a new patient for evaluation of acute acquired bilateral aural fullness, left COM as well as chronic bilateral eustachian tube dysfunction and bilateral mixed hearing loss, referred here today by IGNACIO Collado-CNP.     Several months ago, she had COVID, and her hearing has since gotten better. She works in a loud environment. She has a PMH of multiple surgeries in her left ear, although her primary symptoms for which she presents today are due to issues with her right ear. She was diagnosed with Meniere's disease when she was 31 while living in [facility], for which she was prescribed a course of betahistine for 3 months. Now, she rarely gets symptoms of Meniere's, though she occasionally gets vertigo when she exerts herself significantly.     Past Medical History:  She has a past medical history of Adenomyosis of the uterus and Personal history of other benign neoplasm (02/08/2019).    Surgical History:  She has a past surgical history that includes Other surgical history (01/31/2019); Other surgical history (01/31/2019); and Other surgical history (07/01/2020).     Social History:  She reports that she has quit smoking. Her smoking use included cigarettes. She has never used smokeless tobacco. She reports that she does not drink alcohol and does not use drugs.    Family History:  Family History   Problem Relation Name Age of Onset    Other (waldenstrom's) Mother      Breast cancer Mother  70    Diabetes Mother      Melanoma Mother      Heart attack Father  40    Epilepsy Brother      Pancreatic cancer Other Paternal aunt     Ovarian cancer Other          great aunt on maternal side        Medications:  Current Outpatient Medications   Medication Instructions    azelastine (Astelin) 137 mcg (0.1 %) nasal spray 2 sprays, Each Nostril, 2 times daily    buPROPion XL (Wellbutrin XL) 300 mg 24 hr tablet 1 tablet, oral, Daily     cholecalciferol (VITAMIN D-3) 50 mcg, oral, Daily    fluticasone (Flonase) 50 mcg/actuation nasal spray 1 spray, Each Nostril, 2 times daily, Shake gently. Before first use, prime pump. After use, clean tip and replace cap.    gabapentin (NEURONTIN) 300 mg, oral, 2 times daily    hyoscyamine (ANASPAZ) 0.125 mg, sublingual, Every 4 hours PRN    hyoscyamine 0.125 mg disintegrating tablet DISSOLVE 1 TABLET(0.125 MG) UNDER THE TONGUE EVERY 4 HOURS AS NEEDED    lisdexamfetamine (VYVANSE) 30 mg, oral, Every morning    LORazepam (ATIVAN) 0.5-1 mg, oral, See admin instructions, Take 30 minutes prior to procedure.    mometasone (Elocon) 0.1 % cream Apply thin film to itching/flaking skin of outer ears twice daily x 7 days then use as needed.    nitroglycerin (Rectiv) 0.4 % (w/w) rectal ointment 1 Application, rectal, Every 12 hours scheduled    ondansetron (Zofran) 8 mg tablet oral    SUMAtriptan (Imitrex) 20 mg/actuation nasal spray 20 mg as a single dose in 1 nostril; if symptoms persist or return, may repeat dose after =2 hours.    SUMAtriptan (IMITREX) 50 mg, oral, Once as needed        Allergies:  Patient has no known allergies.    Review of Systems:   A comprehensive 10-point review of systems was obtained including constitutional, neurological, HEENT, pulmonary, cardiovascular, genito-urinary, and other pertinent systems and was negative except as noted in the HPI.      Physical Exam:  Constitutional   General appearance: Healthy-appearing, well-nourished, well groomed, in no acute distress.   Ability to communicate: Normal communication without aids, normal voice quality.       Head and face: Atraumatic with no masses, lesions, or scarring.   Facial strength: Normal strength and symmetry, no synkinesis or facial tic.     Ears  Otoscopic examination:   Otoscopy of the left ear canal demonstrated narrowing of the EAC, with dry ceruen. She is s/p tympanoplasty with cartilage graft and atticotomy. No retraction,  "effusions, or signs concerning for cholesteatoma.    On the right, EAC is adequate, with the presence of non-occlusive cerumen. There is global retraction without atelectasis; no concerns for cholesteatoma.     Nose: Dorsum symmetric with no visible or palpable deformities.     Oral Cavity/Mouth  Lips, teeth, and gums: Normal lips, gums, and dentition.     Oropharynx: Mucosa moist, no lesions.     Neck: Symmetrical, trachea midline. No masses visible.     Neurological/Psychiatric  Cranial Nerve Examination: II - XII grossly intact.  Orientation to person, place, and time: Normal.  Mood and affect: Normal.     Skin: Normal without rashes or lesions.     Pulmonary  Respiratory effort: Chest expands symmetrically.     Cardiovascular: Good peripheral pulses  Peripheral vascular system: No varicosities, carotid pulse normal, no edema. No jugular venous distension.     Extremities: Appearance of extremities: Normal. Gait normal.     Procedure:  Ear Cleaning   Procedure: Cerumen Removal   Indication: Cerumen Impaction and Debris in Ear Canal   Location: Left Ear  Prep: Nothing was placed in the ear canal(s) prior to the procedure.   Preformed by: The procedure was performed by Dr. Ginny Farr MD.  Visualization Instrument: A Microscope and Speculum were placed in the ear canal(s) to visualize the ear canal debris.   Ear cleaning Instruments: Curette and alligator foreceps were used during the procedure.   Outcome: The procedure was successful.   Patient Status: The patient tolerated the procedure well.   Complications: There were no complications.   Patient Instructions: Dry Ear Precautions and Avoid Using Q-Tips           Last Recorded Vitals:  Height 1.537 m (5' 0.5\"), weight 73.5 kg (162 lb).    Relevant Results:  I personally reviewed the audiogram from today (7/5/2024) which demonstrated improvement in air bone gap on the right since her last audiogram. Right air bone conduction is in the mild range, dropping to " moderate at 8000 Hz. On the left, she has mild hearing loss until 2000 Hz, which drops to severe hearing loss at higher frequencies. She has a type B tympanogram on the right, type c tympanogram on the left..     Assessment/Plan   Ms. Gregory presented to Dr. Farr as a new patient for evaluation of acute acquired bilateral aural fullness, left COM as well as chronic bilateral eustachian tube dysfunction and bilateral mixed hearing loss, referred here today by Ilene Barr, APRN-BALAJI. Dr. Farr performed an ear cleaning under direct microscopic visualization, and counseled Ms. Gregory on using olive/mineral oil drops to keep the ears well moisturized. She will continue following up with Ilene as needed.     Scribe Attestation  By signing my name below, I, Manoj Fatima , Scribe   attest that this documentation has been prepared under the direction and in the presence of Ginny Farr MD.      I have reviewed the documentation as scribed by Manoj Fatima and agree with the notes.   Ginny Farr MD

## 2024-07-05 NOTE — LETTER
July 5, 2024     Sarah Hamlin DO  [address] [facility] [address]    Patient: Abril Gregory   YOB: 1970   Date of Visit: 7/5/2024       Dear Dr. Sarah Hamlin DO:    I had the pleasure of seeing your patient, Abril Gregory today. Below are my notes for this consultation.  If you have questions, please do not hesitate to call me. Thank you for allowing me to participate in the care of your patient.        Sincerely,     Ginny Farr MD      CC: No Recipients  _____________________________________________________________________________

## 2024-07-05 NOTE — LETTER
July 8, 2024     ANNA Collado  [id number]  [facility] [address]    Patient: Abril Gregory   YOB: 1970   Date of Visit: 7/5/2024       Dear ANNA Cantrell:    Thank you for referring Abril Gregory to me for evaluation. Below are my notes for this consultation.  If you have questions, please do not hesitate to call me. I look forward to following your patient along with you.       Sincerely,     Ginny Farr MD      CC: Sarah Hamlin, DO  ______________________________________________________________________________________    History Of Present Illness:  Abril Gregory is a 53 y.o. female whom presents to me as a new patient for evaluation of acute acquired bilateral aural fullness, left COM as well as chronic bilateral eustachian tube dysfunction and bilateral mixed hearing loss, referred here today by ANNA Collado.     Several months ago, she had COVID, and her hearing has since gotten better. She works in a loud environment. She has a PMH of multiple surgeries in her left ear, although her primary symptoms for which she presents today are due to issues with her right ear. She was diagnosed with Meniere's disease when she was 31 while living in [facility], for which she was prescribed a course of betahistine for 3 months. Now, she rarely gets symptoms of Meniere's, though she occasionally gets vertigo when she exerts herself significantly.     Past Medical History:  She has a past medical history of Adenomyosis of the uterus and Personal history of other benign neoplasm (02/08/2019).    Surgical History:  She has a past surgical history that includes Other surgical history (01/31/2019); Other surgical history (01/31/2019); and Other surgical history (07/01/2020).     Social History:  She reports that she has quit smoking. Her smoking use included cigarettes. She has never used smokeless tobacco. She reports that she does not drink alcohol and does not use  drugs.    Family History:  Family History   Problem Relation Name Age of Onset   • Other (waldenstrom's) Mother     • Breast cancer Mother  70   • Diabetes Mother     • Melanoma Mother     • Heart attack Father  40   • Epilepsy Brother     • Pancreatic cancer Other Paternal aunt    • Ovarian cancer Other          great aunt on maternal side        Medications:  Current Outpatient Medications   Medication Instructions   • azelastine (Astelin) 137 mcg (0.1 %) nasal spray 2 sprays, Each Nostril, 2 times daily   • buPROPion XL (Wellbutrin XL) 300 mg 24 hr tablet 1 tablet, oral, Daily   • cholecalciferol (VITAMIN D-3) 50 mcg, oral, Daily   • fluticasone (Flonase) 50 mcg/actuation nasal spray 1 spray, Each Nostril, 2 times daily, Shake gently. Before first use, prime pump. After use, clean tip and replace cap.   • gabapentin (NEURONTIN) 300 mg, oral, 2 times daily   • hyoscyamine (ANASPAZ) 0.125 mg, sublingual, Every 4 hours PRN   • hyoscyamine 0.125 mg disintegrating tablet DISSOLVE 1 TABLET(0.125 MG) UNDER THE TONGUE EVERY 4 HOURS AS NEEDED   • lisdexamfetamine (VYVANSE) 30 mg, oral, Every morning   • LORazepam (ATIVAN) 0.5-1 mg, oral, See admin instructions, Take 30 minutes prior to procedure.   • mometasone (Elocon) 0.1 % cream Apply thin film to itching/flaking skin of outer ears twice daily x 7 days then use as needed.   • nitroglycerin (Rectiv) 0.4 % (w/w) rectal ointment 1 Application, rectal, Every 12 hours scheduled   • ondansetron (Zofran) 8 mg tablet oral   • SUMAtriptan (Imitrex) 20 mg/actuation nasal spray 20 mg as a single dose in 1 nostril; if symptoms persist or return, may repeat dose after =2 hours.   • SUMAtriptan (IMITREX) 50 mg, oral, Once as needed        Allergies:  Patient has no known allergies.    Review of Systems:   A comprehensive 10-point review of systems was obtained including constitutional, neurological, HEENT, pulmonary, cardiovascular, genito-urinary, and other pertinent systems and  was negative except as noted in the HPI.      Physical Exam:  Constitutional   General appearance: Healthy-appearing, well-nourished, well groomed, in no acute distress.   Ability to communicate: Normal communication without aids, normal voice quality.       Head and face: Atraumatic with no masses, lesions, or scarring.   Facial strength: Normal strength and symmetry, no synkinesis or facial tic.     Ears  Otoscopic examination:   Otoscopy of the left ear canal demonstrated narrowing of the EAC, with dry ceruen. She is s/p tympanoplasty with cartilage graft and atticotomy. No retraction, effusions, or signs concerning for cholesteatoma.    On the right, EAC is adequate, with the presence of non-occlusive cerumen. There is global retraction without atelectasis; no concerns for cholesteatoma.     Nose: Dorsum symmetric with no visible or palpable deformities.     Oral Cavity/Mouth  Lips, teeth, and gums: Normal lips, gums, and dentition.     Oropharynx: Mucosa moist, no lesions.     Neck: Symmetrical, trachea midline. No masses visible.     Neurological/Psychiatric  Cranial Nerve Examination: II - XII grossly intact.  Orientation to person, place, and time: Normal.  Mood and affect: Normal.     Skin: Normal without rashes or lesions.     Pulmonary  Respiratory effort: Chest expands symmetrically.     Cardiovascular: Good peripheral pulses  Peripheral vascular system: No varicosities, carotid pulse normal, no edema. No jugular venous distension.     Extremities: Appearance of extremities: Normal. Gait normal.     Procedure:  Ear Cleaning   Procedure: Cerumen Removal   Indication: Cerumen Impaction and Debris in Ear Canal   Location: Left Ear  Prep: Nothing was placed in the ear canal(s) prior to the procedure.   Preformed by: The procedure was performed by Dr. Ginny Farr MD.  Visualization Instrument: A Microscope and Speculum were placed in the ear canal(s) to visualize the ear canal debris.   Ear cleaning  "Instruments: Curette and alligator foreceps were used during the procedure.   Outcome: The procedure was successful.   Patient Status: The patient tolerated the procedure well.   Complications: There were no complications.   Patient Instructions: Dry Ear Precautions and Avoid Using Q-Tips           Last Recorded Vitals:  Height 1.537 m (5' 0.5\"), weight 73.5 kg (162 lb).    Relevant Results:  I personally reviewed the audiogram from today (7/5/2024) which demonstrated improvement in air bone gap on the right since her last audiogram. Right air bone conduction is in the mild range, dropping to moderate at 8000 Hz. On the left, she has mild hearing loss until 2000 Hz, which drops to severe hearing loss at higher frequencies. She has a type B tympanogram on the right, type c tympanogram on the left..     Assessment/Plan  Ms. Gregory presented to Dr. Farr as a new patient for evaluation of acute acquired bilateral aural fullness, left COM as well as chronic bilateral eustachian tube dysfunction and bilateral mixed hearing loss, referred here today by IGNACIO Collado-BALAJI. Dr. Farr performed an ear cleaning under direct microscopic visualization, and counseled Ms. Gregory on using olive/mineral oil drops to keep the ears well moisturized. She will continue following up with Ilene as needed.     Scribe Attestation  By signing my name below, IManoj , Scribe   attest that this documentation has been prepared under the direction and in the presence of Ginny Farr MD.      I have reviewed the documentation as scribed by Manoj Fatima and agree with the notes.   Ginny Farr MD    "

## 2024-07-08 PROBLEM — H69.92 DYSFUNCTION OF LEFT EUSTACHIAN TUBE: Status: ACTIVE | Noted: 2024-07-08

## 2024-07-08 PROBLEM — H90.3 SENSORINEURAL HEARING LOSS (SNHL) OF BOTH EARS: Status: ACTIVE | Noted: 2024-07-08

## 2024-08-19 NOTE — PROGRESS NOTES
Patient: Abril Gregory    [id number]  : 1970 -- AGE 53 y.o.    Provider: Paula Spence MD     Location [facility]   Service Date: 2024          [facility]  New Visit Note    HISTORY OF PRESENT ILLNESS     The patient's referring provider is: Sarah Hamlin DO    REASON FOR VISIT:   Chief Complaint   Patient presents with    New Patient Visit        HISTORY OF PRESENT ILLNESS   Ms. Abril Gregory is a 53 y.o. female with past medical history of allergic rhinitis, depression/anxiety, HLD, ADHD, back pain who presents to a [facility] for a sleep medicine evaluation with concerns of possible MEGAN.    PAST SLEEP HISTORY  She had a HSAT on 24 at a BMI of 30.6. TMT was 379 minutes. Her REI3% was 49/h (supine 74/h and nonsupine 24/h) and her REI4% was 46/h (supine 72/h and nonsupine 19/h). The O2 ronnie was 76% and T88 was 15.5 minutes.     CURRENT HISTORY  On today's visit, the patient reports unrefreshing sleep with significant migraines that wake her up from sleep often at the point of vomiting.  She is currently undergoing a neurology evaluation with MRI and EEG.  Migraines are thought to be hormonal; however, given family history of nocturnal epilepsy, a full evaluation is being performed. She is thien-menopausal s/p hysterectomy, but still has ovaries.  She has had corrected surgery for a deviated septum, no nasal patency issues currently.    Over the past five years, the patient's weight has  increased.    Sleep schedule:  In bed: 10:30 PM  Activities in bed:   Bedtime Activities: turns out the lights  Subjective sleep latency:  30 min  Awakenings during night: 2-3x/night  Length of awakenings: falls asleep pretty quickly  Final awakening time: 6 AM  Out of bed: 6 AM  Overall estimate of total sleep time: 7 hr/night    Weekends:   In bed: midnight  Subjective sleep latency:  30 min - 1 hour  Awakenings during night:  2-3x/night  Length of awakenings: falls asleep pretty quickly  Final awakening time: 9 AM  Out of bed: 9 AM  Overall estimate of total sleep time: 8 hr/night    Preferred sleeping position: prone  Typically sleeps alone.     Issues with sleep onset or maintenance: none    Associated sleep symptoms:  Breathing during sleep: snorting during sleep, gasping/choking for air, and nasal congestion  Behaviors at night: Yes - sometimes acts out dreams or kicks  Sleep paralysis: when she was a child, not as adult  Hypnogogic or hypnopompic hallucinations: No   Cataplexy: No     Leg symptoms and timing:  - Sensations: Patient does not have unusual sensations in their extremities that cause an urge to move them     Sleep environment:  Pets in bed :  Yes - 2 cats  Bedroom temperature: cool  Noise :   No   Issues with bed comfort :   No     Daytime Symptoms:  On awakening patient reports: wake unrefreshed, morning headaches, morning dry mouth, hard to get out of bed, and  stimulant use (see med list)    Daytime: During the day, she does not doze unintentionally while inactive.  During more active tasks, she sometimes is drowsy.  With regards to daytime napping, the patient reports sometimes taking naps. If she takes a nap, typically she awakens refreshed at times.  She does report that poor sleep results in mood-related irritability. She does report that poor sleep results in issues with memory/concentration.    Driving History: she has a 4 min commute from home to work. The patient typically drives to work. With driving, the patient denies sleepy driving, with 0 sleepiness-related motor vehicle accidents and 0 near misses.      ESS: 8  RUSS: 5  FOSQ:  33      REVIEW OF SYSTEMS     REVIEW OF SYSTEMS  Review of Systems  SLEEP ROS: snoring, choking, decreased memory, decreased concentration, increased in weight   , feels sleepy during the day      ALLERGIES AND MEDICATIONS     ALLERGIES  No Known Allergies    MEDICATIONS  Current  Outpatient Medications   Medication Sig Dispense Refill    azelastine (Astelin) 137 mcg (0.1 %) nasal spray USE 2 SPRAYS IN EACH NOSTRIL TWICE DAILY 30 mL 2    buPROPion XL (Wellbutrin XL) 300 mg 24 hr tablet Take 1 tablet (300 mg) by mouth once daily.      cholecalciferol (Vitamin D-3) 50 mcg (2,000 unit) capsule Take 1 capsule (50 mcg) by mouth early in the morning..      fluticasone (Flonase) 50 mcg/actuation nasal spray Administer 1 spray into each nostril 2 times a day. Shake gently. Before first use, prime pump. After use, clean tip and replace cap. 16 g 11    gabapentin (Neurontin) 300 mg capsule Take 1 capsule (300 mg) by mouth 2 times a day. 180 capsule 1    hyoscyamine 0.125 mg disintegrating tablet Place 1 tablet (0.125 mg) under the tongue every 4 hours if needed (PRN). 30 tablet 0    hyoscyamine 0.125 mg disintegrating tablet DISSOLVE 1 TABLET(0.125 MG) UNDER THE TONGUE EVERY 4 HOURS AS NEEDED 30 tablet 0    lisdexamfetamine (Vyvanse) 30 mg capsule Take 1 capsule (30 mg) by mouth once daily in the morning.      mometasone (Elocon) 0.1 % cream Apply thin film to itching/flaking skin of outer ears twice daily x 7 days then use as needed. 15 g 1    ondansetron (Zofran) 8 mg tablet Take by mouth.      SUMAtriptan (Imitrex) 20 mg/actuation nasal spray 20 mg as a single dose in 1 nostril; if symptoms persist or return, may repeat dose after =2 hours. 1 each 0    LORazepam (Ativan) 0.5 mg tablet Take 1-2 tablets (0.5-1 mg) by mouth see administration instructions. Take 30 minutes prior to procedure. 4 tablet 0    nitroglycerin (Rectiv) 0.4 % (w/w) rectal ointment Insert 1 Application into the rectum every 12 hours. 30 g 0    SUMAtriptan (Imitrex) 50 mg tablet Take 1 tablet (50 mg) by mouth 1 time if needed for migraine. 9 tablet 3     No current facility-administered medications for this visit.     PAST HISTORY     PAST MEDICAL HISTORY  She  has a past medical history of Adenomyosis of the uterus and Personal  history of other benign neoplasm (02/08/2019).    No Known Allergies     PAST SURGICAL HISTORY:  Past Surgical History:   Procedure Laterality Date    OTHER SURGICAL HISTORY  01/31/2019    Uterine myomectomy    OTHER SURGICAL HISTORY  01/31/2019    Cervical loop electrosurgical excision    OTHER SURGICAL HISTORY  07/01/2020    Hysterectomy       FAMILY HISTORY  Family History   Problem Relation Name Age of Onset    Other (waldenstrom's) Mother      Breast cancer Mother  70    Diabetes Mother      Melanoma Mother      Heart attack Father  40    Epilepsy Brother      Pancreatic cancer Other Paternal aunt     Ovarian cancer Other          great aunt on maternal side     She does have a family history of sleep disorder (e.g., sleep apnea, narcolepsy in any first degree relatives). Mother possible RLS. Brother with nocturnal epilepsy and MEGAN on CPAP.     SOCIAL HISTORY  She  reports that she has quit smoking. Her smoking use included cigarettes. She has never used smokeless tobacco. She reports that she does not drink alcohol and does not use drugs. She currently lives alone.     Work history: The patient currently works full time as a  .     Caffeine consumption: Yes - 24 oz coffee daily  Alcohol consumption: Yes - occasionally 1x/week  Smoking: No - quit 2008  Marijuana: Yes - for insomnia that occurred when she quit marijuana recently.  1 bowl before bed.    PHYSICAL EXAM     Physical Examination: /79 (BP Location: Right arm, Patient Position: Sitting)   Pulse 85   Temp 36 °C (96.8 °F) (Temporal)   Wt 72.6 kg (160 lb)   SpO2 97%   BMI 30.73 kg/m²     PREVIOUS WEIGHTS:  Wt Readings from Last 3 Encounters:   08/20/24 72.6 kg (160 lb)   07/05/24 73.5 kg (162 lb)   05/06/24 74 kg (163 lb 3.2 oz)     General: The patient is a pleasant female, in no acute distress. HEENT: She has a  a modified Mallampati grade 4 airway, unable to visualize tonsils. The soft palate was normal and  "low hanging and the uvula was normal thickened. The A/P diameter of the velopharynx was narrowed. The oropharynx was shallow in the A/P diameter. Lateral wall narrowing was present. Tongue ridging was not present. Erythema of the posterior pharynx was not present. Mucosal hypertrophy in the posterior oropharynx was not present. Mild Retrognathia was and micrognathia was present. Neck: The neck was not enlarged. No JVP, bruits or lymphadenopathy was appreciated. Chest: Clear to auscultation. No wheezes, rales, or rhonchi. Cardiovascular: Regular rate and rhythm. No murmurs, gallops, or clicks. Abdomen: Soft, nontender, nondistended. Positive bowel sounds. Extremities: No clubbing, cyanosis, or edema is noted. Neurologic exam: Alert, oriented x3 and was grossly non-focal.    RESULTS/DATA     No results found for: \"IRON\", \"TRANSFERRIN\", \"IRONSAT\", \"TIBC\", \"FERRITIN\"    Bicarbonate (mmol/L)   Date Value   04/03/2024 28   01/11/2023 29   02/07/2022 27   02/17/2021 31       DIAGNOSES     Problem List and Orders  Diagnoses and all orders for this visit:  MEGAN (obstructive sleep apnea)  -     Referral to Adult Sleep Medicine  -     Positive Airway Pressure (PAP) Therapy  Class 1 obesity due to excess calories with body mass index (BMI) of 30.0 to 30.9 in adult, unspecified whether serious comorbidity present    ASSESSMENT/PLAN     Ms. Gregory is a 53 y.o. female and with past medical history of allergic rhinitis, depression/anxiety, HLD, ADHD, back pain. She presents to  the [facility] to address MEGAN.  Patient is willing to try CPAP therapy.  We discussed CPAP compliance and importance of treatment.  Patient expressed understanding and was agreeable with plan.     #MEGAN  -noted severe MEGAN on HSAT with positional component  -Start APAP 5-15 cmH2O via MSC  -Discussed importance of compliance and sequelae of untreated MEGAN.  - Discussed importance of not driving while drowsy.  -Discussed possibility " of reducing use of stimulant medication in the future with consistent efficacious treatment of MEGAN.  -Discussed alternatives for MEGAN treatment such as OAT and positional therapy.    #Obesity  -BMI today 30  -Discussed continuing to work on weight loss measures to improve MEGAN.    Follow up: 3 months    Patient was staffed with Dr. Stokes.    Paula Spence MD    Sleep Medicine Fellow  Division of Pulmonology and Sleep Medicine  [facility]         ----------------------------------------------------------------------------------------------------------------------  8/20/2024  8:26 PM ::     Written by Angel Stokes MD, PhD    Briefly, Ms. Gregory, a 53 y.o. female, was evaluated at the [facility] for her new diagnosis of MEGAN.     I personally saw and evaluated the patient. I discussed the patient with the Fellow and agree with their note which accurately reflects my own findings and plan. In summary, she has severe MEGAN based on her testing. Given her headaches/migraines and unrefreshing sleep, we would recommend a trial of APAP. We briefly discussed alternatives to PAP if she does not tolerate. Followup 1-2 months after using PAP.    Please refer to the full clinic note for specific details.    Angel Stokes MD, PhD  Division of Pulmonary, Critical Care, and Sleep Medicine  Clinical Associate Professor of Medicine, [facility]  , Sleep Medicine  System Director,  Sleep Medicine    --------------------------------------------------------------------------------------------------------------------

## 2024-08-20 ENCOUNTER — OFFICE VISIT (OUTPATIENT)
Dept: SLEEP MEDICINE | Facility: HOSPITAL | Age: 54
End: 2024-08-20
Payer: COMMERCIAL

## 2024-08-20 VITALS
OXYGEN SATURATION: 97 % | BODY MASS INDEX: 30.73 KG/M2 | HEART RATE: 85 BPM | DIASTOLIC BLOOD PRESSURE: 79 MMHG | WEIGHT: 160 LBS | TEMPERATURE: 96.8 F | SYSTOLIC BLOOD PRESSURE: 123 MMHG

## 2024-08-20 DIAGNOSIS — E66.09 CLASS 1 OBESITY DUE TO EXCESS CALORIES WITH BODY MASS INDEX (BMI) OF 30.0 TO 30.9 IN ADULT, UNSPECIFIED WHETHER SERIOUS COMORBIDITY PRESENT: ICD-10-CM

## 2024-08-20 DIAGNOSIS — G47.33 OSA (OBSTRUCTIVE SLEEP APNEA): Primary | ICD-10-CM

## 2024-08-20 PROCEDURE — G2211 COMPLEX E/M VISIT ADD ON: HCPCS | Performed by: INTERNAL MEDICINE

## 2024-08-20 PROCEDURE — 99204 OFFICE O/P NEW MOD 45 MIN: CPT | Performed by: INTERNAL MEDICINE

## 2024-08-20 PROCEDURE — 1036F TOBACCO NON-USER: CPT | Performed by: INTERNAL MEDICINE

## 2024-08-20 PROCEDURE — 99214 OFFICE O/P EST MOD 30 MIN: CPT | Mod: GC | Performed by: INTERNAL MEDICINE

## 2024-08-20 SDOH — ECONOMIC STABILITY: FOOD INSECURITY: WITHIN THE PAST 12 MONTHS, YOU WORRIED THAT YOUR FOOD WOULD RUN OUT BEFORE YOU GOT MONEY TO BUY MORE.: NEVER TRUE

## 2024-08-20 SDOH — ECONOMIC STABILITY: FOOD INSECURITY: WITHIN THE PAST 12 MONTHS, THE FOOD YOU BOUGHT JUST DIDN'T LAST AND YOU DIDN'T HAVE MONEY TO GET MORE.: NEVER TRUE

## 2024-08-20 ASSESSMENT — PATIENT HEALTH QUESTIONNAIRE - PHQ9
SUM OF ALL RESPONSES TO PHQ9 QUESTIONS 1 & 2: 0
1. LITTLE INTEREST OR PLEASURE IN DOING THINGS: NOT AT ALL
2. FEELING DOWN, DEPRESSED OR HOPELESS: NOT AT ALL

## 2024-08-20 ASSESSMENT — LIFESTYLE VARIABLES
SKIP TO QUESTIONS 9-10: 1
HOW OFTEN DO YOU HAVE A DRINK CONTAINING ALCOHOL: NEVER
HOW MANY STANDARD DRINKS CONTAINING ALCOHOL DO YOU HAVE ON A TYPICAL DAY: PATIENT DOES NOT DRINK
AUDIT-C TOTAL SCORE: 0
HOW OFTEN DO YOU HAVE SIX OR MORE DRINKS ON ONE OCCASION: NEVER

## 2024-08-20 ASSESSMENT — PAIN SCALES - GENERAL: PAINLEVEL: 0-NO PAIN

## 2024-08-20 NOTE — PATIENT INSTRUCTIONS
[facility] Sleep Medicine  [facility]  [address]  [facility] [address]  [phone]       NAME: Abril Gregory   DATE: 08/20/24    Your Sleep Provider Today: Angel Stokes MD PhD  Your Primary Care Physician: Sarah Hamlin DO   Your Referring Provider: Sarah Hamlin DO    DIAGNOSIS:   1. MEGAN (obstructive sleep apnea)  Referral to Adult Sleep Medicine    Positive Airway Pressure (PAP) Therapy          Thank you for coming to the Sleep Medicine Clinic today! Your sleep medicine provider today was: Angel Stokes MD PhD Below is a summary of your treatment plan, other important information, and our contact numbers:      TREATMENT PLAN     [employer] will contact you regarding setting up your CPAP machine.  If you don't hear from them within 2 weeks, call our office.    Instructions - Common MEGAN Recs: - For your sleep apnea, continue to use your PAP every night and use it whenever you are sleeping.   - Continue weight loss efforts with dietary and exercise measures to minimize the severity of your sleep apnea.  - Avoid alcohol or sedatives several hours prior to sleeping.   - Get additional supplies for your PAP (e.g., mask, hose, filters) every 3 months or as your insurance allows from your DME company. Replacement cushions for your PAP mask can be requested monthly if airseals are an issue.  - Remember to clean your mask, tubings, and water chamber regularly as instructed.  - Avoid driving or operating heavy machinery when drowsy. A person driving while sleepy is five (5) times more likely to have an accident. If you feel sleepy, pull over and take a short power nap (sleep for less than 30 minutes). Otherwise, ask somebody to drive you.    Follow-up Appointment:   3 months      IMPORTANT INFORMATION     Call 911 for medical emergencies.  Our offices are generally open from Monday-Friday, 9 am - 5 pm.  If you need to get in  touch with me, you may either call me and my team(number is below) or you can use [employer].  If a referral for a test, for CPAP, or for another specialist was made, and you have not heard about scheduling this within a week, please call scheduling at [phone] ([phone]).  If you are unable to make your appointment for clinic or an overnight study, kindly call the office at least 48 hours in advance to cancel and reschedule.  If you are on CPAP, please bring your device's card or the device to each clinic appointment.   There are no supporting services by either the sleep doctors or their staff on weekends and Holidays, or after 5 PM on weekdays.   If you have been asked to come to a sleep study, make sure you bring toiletries, a comfy pillow, and any nighttime medications that you may regularly take. Also be sure to eat dinner before you arrive. We generally do not provide meals.      PRESCRIPTIONS     We require 7 days advanced notice for prescription refills. If we do not receive the request in this time, we cannot guarantee that your medication will be refilled in time.      IMPORTANT PHONE NUMBERS     Sleep Medicine Clinic Fax: [phone]  Appointments (for Adult Sleep Clinic): [phone] - option 2  Appointments (For Sleep Studies): [phone] - option 3  Behavioral Sleep Medicine: [phone]  Sleep Surgery: [phone]  ENT (Otolaryngology): [phone]  Headache Clinic (Neurology): [phone]  Neurology: [phone]  Psychiatry: [phone]  Pulmonary Function Testing (PFT) Center: [phone]  Pulmonary Medicine: [phone]  [employer] (DME): [phone]  [employer] (DME): [phone]  [facility] ([facility] [phone]      OUR ADULT SLEEP MEDICINE TEAM   Please do not hesitate to call the office or sleep nurse with any questions between appointments:    Adult Sleep Nurses (Bryanna Stark RN and Lila Jung RN):  For clinical questions and  "refilling prescriptions: [phone]  Email sleep diaries and other documents at: [email or web address]    Adult Sleep Medicine Secretaries:  Aziza Robb (For Yamileth/Yañez/Krise/Strohl/Yeh/Huitron):   P: [phone]  F: [phone]  Destini Norton (For Kike/Leonor): P: [phone]  Fax: [phone]  Marisol Alarcon (For Jurcevic/Blank): P: [phone]  F: [phone]  Sophia Buchanan (For Carmela): P: [phone]  F: [phone]  Joanne Davison (For Lillie/Jennifer/Zakhary): P: [phone]  F: [phone]  Kathryn Mckeon (For Devonte/Cristobal): P: [phone]  F: [phone]     Adult Sleep Medicine Advanced Practice Providers:  Ruben Ruggiero ([place], [place])  Parisa Rodriguez ([facility])  Janna Galvez CNP ([place], [place], [place])  Niyah Staples CNP ([place], [place], [place])  Ashli Casey ([place], [place], [place])  Mikey Jain CNP ([facility])    CONTACTING YOUR SLEEP MEDICINE PROVIDER     Send a message directly to your provider through \"My Chart\", which is the email service through your  Records Account: [email or web address]   Call [phone] and leave a message. One of the administrative assistants will forward the message to your sleep medicine provider through \"My Chart\" and/or email.     Your sleep medicine provider for this visit was: Angel Stokes MD PhD   "

## 2024-09-09 ENCOUNTER — LAB (OUTPATIENT)
Dept: LAB | Facility: LAB | Age: 54
End: 2024-09-09
Payer: COMMERCIAL

## 2024-09-09 DIAGNOSIS — R41.82 ALTERED MENTAL STATUS, UNSPECIFIED: Primary | ICD-10-CM

## 2024-09-09 DIAGNOSIS — R41.82 ALTERED MENTAL STATUS, UNSPECIFIED: ICD-10-CM

## 2024-09-09 LAB
BUN SERPL-MCNC: 14 MG/DL (ref 6–23)
CREAT SERPL-MCNC: 0.8 MG/DL (ref 0.5–1.05)
EGFRCR SERPLBLD CKD-EPI 2021: 87 ML/MIN/1.73M*2

## 2024-09-09 PROCEDURE — 82565 ASSAY OF CREATININE: CPT

## 2024-09-09 PROCEDURE — 36415 COLL VENOUS BLD VENIPUNCTURE: CPT

## 2024-09-09 PROCEDURE — 84520 ASSAY OF UREA NITROGEN: CPT

## 2024-09-17 ENCOUNTER — APPOINTMENT (OUTPATIENT)
Dept: GASTROENTEROLOGY | Facility: CLINIC | Age: 54
End: 2024-09-17
Payer: COMMERCIAL

## 2024-09-18 NOTE — PROGRESS NOTES
Abril Gregory female   1970 54 y.o.   [id number]      Chief Complaint  Annual mammogram and exam, high risk surveillance care    History Of Present Illness  Abril Gregory is a very pleasant 54 year old Ashkenazi Hinduism descent woman followed [facility] the [facility] for high risk surveillance care. 2020 she underwent genetic testing, 36 gene panel, negative. She has maternal family history of breast cancer and paternal family history of pancreatic cancer. She denies breast surgery or biopsy. 4/3/2024 FSH drawn demonstrating perimenopausal status. She presents today for annual mammogram and exam. She denies any new masses or lumps.    BREAST IMAGIN2023 Bilateral screening mammogram, indicates BI-RADS Category 1. 4/10/2021 Bilateral Fast MRI, indicates BI-RADS Category 1.     FEMALE HISTORY: menarche age 8, nulliparous, OCP's x 10 years, menopause age unknown, s/p TLH-BS in 2019 due to fibroids with pathology demonstrating HSIL of cervix, hot flashes daily, no HRT, scattered fibroglandular tissue     FAMILY CANCER HISTORY:  Mother: Waldenstrom's in 60s and breast cancer at 71, negative BRCA 1/2  Maternal Aunt: Breast cancer at 40  Maternal Great Aunt (through [facility]): Ovarian cancer in 60s  Maternal 1st Cousin once removed (daughter of different maternal great aunt): Stomach/colon cancer at 60  Maternal Grandfather: Prostate cancer in late 60- early 70s  Maternal 1st once removed (maternal great uncle): Breast cancer  Maternal 2nd Cousin (daughter of above aunt): cancer NOS,  in 20s  Paternal Grandfather: Pancreatic cancer at 62  Paternal Aunt: Pancreatic cancer at 75      Surgical History  She has a past surgical history that includes Other surgical history (2019); Other surgical history (2019); and Other surgical history (2020).     Social History  She reports that she has quit smoking. Her smoking use included cigarettes. She has never used smokeless tobacco. She reports that she  does not drink alcohol and does not use drugs.    Family History  Family History   Problem Relation Name Age of Onset    Other (waldenstrom's) Mother 72     Breast cancer Mother 72 70    Diabetes Mother 72     Melanoma Mother 72     Heart attack Father  40    Epilepsy Brother      Pancreatic cancer Other Paternal aunt     Ovarian cancer Other          great aunt on maternal side    Breast cancer Mother's Sister 40         Allergies  Patient has no known allergies.    Medications  Current Outpatient Medications   Medication Instructions    azelastine (Astelin) 137 mcg (0.1 %) nasal spray 2 sprays, Each Nostril, 2 times daily    buPROPion XL (Wellbutrin XL) 300 mg 24 hr tablet 1 tablet, oral, Daily    cholecalciferol (VITAMIN D-3) 50 mcg, oral, Daily    fluticasone (Flonase) 50 mcg/actuation nasal spray 1 spray, Each Nostril, 2 times daily, Shake gently. Before first use, prime pump. After use, clean tip and replace cap.    gabapentin (NEURONTIN) 300 mg, oral, 2 times daily    hyoscyamine (ANASPAZ) 0.125 mg, sublingual, Every 4 hours PRN    hyoscyamine 0.125 mg disintegrating tablet DISSOLVE 1 TABLET(0.125 MG) UNDER THE TONGUE EVERY 4 HOURS AS NEEDED    lisdexamfetamine (VYVANSE) 30 mg, oral, Every morning    LORazepam (ATIVAN) 0.5-1 mg, oral, See admin instructions, Take 30 minutes prior to procedure.    mometasone (Elocon) 0.1 % cream Apply thin film to itching/flaking skin of outer ears twice daily x 7 days then use as needed.    ondansetron (Zofran) 8 mg tablet oral    SUMAtriptan (Imitrex) 20 mg/actuation nasal spray 20 mg as a single dose in 1 nostril; if symptoms persist or return, may repeat dose after =2 hours.    SUMAtriptan (IMITREX) 50 mg, oral, Once as needed         REVIEW OF SYSTEMS    Constitutional:  Negative for appetite change, fatigue, fever and unexpected weight change.   HENT:  Negative for ear pain, hearing loss, nosebleeds, sore throat and trouble swallowing.    Eyes:  Negative for discharge,  itching and visual disturbance.   Respiratory:  Negative for cough, chest tightness and shortness of breath.    Cardiovascular:  Negative for chest pain, palpitations and leg swelling.   Breast: as indicated in HPI  Gastrointestinal:  Negative for abdominal pain, constipation, diarrhea and nausea.   Endocrine: Negative for cold intolerance and heat intolerance.   Genitourinary:  Negative for dysuria, frequency, hematuria, pelvic pain and vaginal bleeding.   Musculoskeletal:  Negative for arthralgias, back pain, gait problem, joint swelling and myalgias.   Skin:  Negative for color change and rash.   Allergic/Immunologic: Negative for environmental allergies and food allergies.   Neurological:  Negative for dizziness, tremors, speech difficulty, weakness, numbness and headaches.   Hematological:  Does not bruise/bleed easily.   Psychiatric/Behavioral:  Negative for agitation, dysphoric mood and sleep disturbance. The patient is not nervous/anxious.         Past Medical History  She has a past medical history of Adenomyosis of the uterus and Personal history of other benign neoplasm (02/08/2019).     Physical Exam  Patient is alert and oriented x3 and in a relaxed and appropriate mood. Her gait is steady and hand grasps are equal. Sclera is clear. The breasts are nearly symmetrical. The tissue is soft without palpable abnormalities, discrete nodules or masses. The skin and nipples appear normal. There is no cervical, supraclavicular or axillary lymphadenopathy. Heart rate and rhythm normal, S1 and S2 appreciated. The lungs are clear to auscultation bilaterally. Abdomen is soft and non-tender.     Physical Exam     Last Recorded Vitals  Vitals:    09/30/24 0840   BP: 119/79   Pulse: 82   Temp: 36.7 °C (98.1 °F)       Relevant Results   Time was spent viewing digital images of the radiology testing with the patient. I explained the results in depth, along with suggested explanation for follow up recommendations based on  the testing results. BI-RADS Category     Imaging    Narrative & Impression   Interpreted By:  Sharita Diggs,   STUDY:  BI MAMMO RIGHT DIAGNOSTIC TOMOSYNTHESIS; BI US BREAST LIMITED RIGHT;  9/30/2024 9:26 am; 9/30/2024 9:49 am      ACCESSION NUMBER(S):  [id number]; [id number]      ORDERING CLINICIAN:  JAQUAN QUINONES      INDICATION:  Callback from same-day screening mammogram for architectural  distortion in the right breast. Family history of breast cancer.      ,R92.8 Other abnormal and inconclusive findings on diagnostic imaging  of breast      COMPARISON:  Same-day screening, mammogram 09/28/2023 and all relevant prior  breast imaging exams available at the time of dictation.      FINDINGS:  MAMMOGRAPHY: 2D and tomosynthesis images were reviewed at 1 mm slice  thickness.      Density:  There are scattered areas of fibroglandular density.      Subtle architectural distortion persists on additional mammographic  views in the upper-outer quadrant of the right breast at middle to  posterior depth, best appreciated on the MLO/ML views.      ULTRASOUND: Targeted ultrasound with elastography was performed by a  registered sonographer in the upper-outer quadrant of the right  breast and right axilla.. Vague areas of shadowing are identified at  10 o'clock, 10 cm from the nipple with no discrete mass identified.  The tissue is soft on elastography.      Scanning of the right axilla demonstrates 4 morphologically normal  lymph nodes with preserved fatty hilum and normal cortical thickness.      IMPRESSION:  Suspicious subtle right breast architectural distortion without  definite sonographic correlate or axillary lymphadenopathy. Further  evaluation with surgical consultation and tomosynthesis-guided biopsy  is recommended.      Patient was seen at breast clinic on the same day and was scheduled  for a same-day biopsy. A pre-procedure form was filled out.      Method of Detection: Category Sdbt - 3D Screening       BI-RADS CATEGORY:  BI-RADS Category:  4 Suspicious.  Recommendation:  Surgical Consultation and Biopsy.  Recommended Date:  Immediate.  Laterality:  Right.     Narrative & Impression   Interpreted By:  Sharita Diggs,   STUDY:  BI MAMMO BILATERAL SCREENING TOMOSYNTHESIS;  9/30/2024 8:37 am      ACCESSION NUMBER(S):  [id number]      ORDERING CLINICIAN:  JAQUAN QUINONES      INDICATION:  Screening. Family history of breast cancer.      ,Z12.31 Encounter for screening mammogram for malignant neoplasm of  breast      COMPARISON:  09/28/2023 and all relevant prior breast imaging exams available at  the time of dictation.      FINDINGS:  2D and tomosynthesis images were reviewed at 1 mm slice thickness.      Density:  There are scattered areas of fibroglandular density.      Architectural distortion is questioned in upper-outer quadrant of the  right breast at middle to posterior depth. No suspicious masses or  calcifications are identified in the left breast.      IMPRESSION:  1. Right breast architectural distortion. Additional evaluation with  diagnostic mammogram and ultrasound was performed on the same day,  reported separately.      2. No mammographic evidence of malignancy in the left breast.      BI-RADS CATEGORY:  BI-RADS Category:  0 Incomplete; Need Additional Imaging Evaluation  Recommendation:  Additional Imaging.  Recommended Date:  Immediate.  Laterality:  Right.       Risk Profiles          Assessment/Plan   High risk surveillance care, abnormal mammogram, right breast distortion without sonographic correlate, BRCA negative, Ashkenazi [employer], family history of breast cancer, scattered fibroglandular tissue     Plan: Right stereotactic guided biopsy. Fast MRI in March 2025. Discuss Estradiol and FSH/LH for menopausal status pending biopsy results.     Patient Discussion/Summary  I recommend a right breast mammogram guided biopsy. A breast radiology physician will perform the procedure. Possible  diagnoses include benign, atypia or cancer. Bruising and mild discomfort after the biopsy is normal and will improve. I typically have results in 3-5 business days. I will call you with the results, please have your phone handy to take my call. If you receive medical information from [facility] Personal Health Record, it is possible to view or see results of your biopsy or procedure before I contact you directly. I will provide recommendations for future follow up based on your biopsy results.     You can see your health information, review clinical summaries from office visits & test results online when you follow your health with MY  Chart, a personal health record. To sign up go to [email or web address]. If you need assistance with signing up or trouble getting into your account call [employer] Patient Line 24/7 at [phone].    Should you have any questions or concerns after biopsy, please do not hesitate to call my office at [phone]. If it has been more than a week since your biopsy was performed and you have not received results, please call my office at [phone]. Thank you for choosing [facility] and trusting me as your healthcare provider. I am honored to be a provider on your health care team and I remain dedicated to helping you achieve your health goals.    Kenia Puente, APRN-CNP

## 2024-09-30 ENCOUNTER — HOSPITAL ENCOUNTER (OUTPATIENT)
Dept: RADIOLOGY | Facility: CLINIC | Age: 54
Discharge: HOME | End: 2024-09-30
Payer: COMMERCIAL

## 2024-09-30 ENCOUNTER — OFFICE VISIT (OUTPATIENT)
Dept: SURGICAL ONCOLOGY | Facility: CLINIC | Age: 54
End: 2024-09-30
Payer: COMMERCIAL

## 2024-09-30 VITALS
WEIGHT: 160.16 LBS | SYSTOLIC BLOOD PRESSURE: 119 MMHG | DIASTOLIC BLOOD PRESSURE: 79 MMHG | BODY MASS INDEX: 30.75 KG/M2 | HEART RATE: 82 BPM | TEMPERATURE: 98.1 F

## 2024-09-30 VITALS — HEIGHT: 61 IN | WEIGHT: 160.05 LBS | BODY MASS INDEX: 30.22 KG/M2

## 2024-09-30 DIAGNOSIS — R92.8 ABNORMAL MAMMOGRAM: Primary | ICD-10-CM

## 2024-09-30 DIAGNOSIS — R92.8 OTHER ABNORMAL AND INCONCLUSIVE FINDINGS ON DIAGNOSTIC IMAGING OF BREAST: ICD-10-CM

## 2024-09-30 DIAGNOSIS — R92.8 ABNORMAL MAMMOGRAM: ICD-10-CM

## 2024-09-30 DIAGNOSIS — Z12.31 SCREENING MAMMOGRAM FOR BREAST CANCER: ICD-10-CM

## 2024-09-30 DIAGNOSIS — Z12.39 BREAST CANCER SCREENING, HIGH RISK PATIENT: ICD-10-CM

## 2024-09-30 PROCEDURE — 99214 OFFICE O/P EST MOD 30 MIN: CPT | Performed by: NURSE PRACTITIONER

## 2024-09-30 PROCEDURE — 2720000007 HC OR 272 NO HCPCS

## 2024-09-30 PROCEDURE — 76642 ULTRASOUND BREAST LIMITED: CPT | Mod: RIGHT SIDE | Performed by: STUDENT IN AN ORGANIZED HEALTH CARE EDUCATION/TRAINING PROGRAM

## 2024-09-30 PROCEDURE — 77067 SCR MAMMO BI INCL CAD: CPT

## 2024-09-30 PROCEDURE — 76642 ULTRASOUND BREAST LIMITED: CPT | Mod: RT

## 2024-09-30 PROCEDURE — 76981 USE PARENCHYMA: CPT | Mod: RT

## 2024-09-30 PROCEDURE — A4648 IMPLANTABLE TISSUE MARKER: HCPCS

## 2024-09-30 PROCEDURE — 77065 DX MAMMO INCL CAD UNI: CPT | Mod: RIGHT SIDE | Performed by: STUDENT IN AN ORGANIZED HEALTH CARE EDUCATION/TRAINING PROGRAM

## 2024-09-30 PROCEDURE — 1036F TOBACCO NON-USER: CPT | Performed by: NURSE PRACTITIONER

## 2024-09-30 PROCEDURE — 2780000003 HC OR 278 NO HCPCS

## 2024-09-30 PROCEDURE — 77065 DX MAMMO INCL CAD UNI: CPT

## 2024-09-30 PROCEDURE — 77061 BREAST TOMOSYNTHESIS UNI: CPT | Mod: RIGHT SIDE | Performed by: STUDENT IN AN ORGANIZED HEALTH CARE EDUCATION/TRAINING PROGRAM

## 2024-09-30 PROCEDURE — 2500000005 HC RX 250 GENERAL PHARMACY W/O HCPCS: Performed by: STUDENT IN AN ORGANIZED HEALTH CARE EDUCATION/TRAINING PROGRAM

## 2024-09-30 PROCEDURE — 19081 BX BREAST 1ST LESION STRTCTC: CPT | Mod: RT

## 2024-09-30 PROCEDURE — 77067 SCR MAMMO BI INCL CAD: CPT | Mod: BILATERAL PROCEDURE | Performed by: STUDENT IN AN ORGANIZED HEALTH CARE EDUCATION/TRAINING PROGRAM

## 2024-09-30 PROCEDURE — 77063 BREAST TOMOSYNTHESIS BI: CPT | Mod: BILATERAL PROCEDURE | Performed by: STUDENT IN AN ORGANIZED HEALTH CARE EDUCATION/TRAINING PROGRAM

## 2024-09-30 PROCEDURE — 77061 BREAST TOMOSYNTHESIS UNI: CPT | Mod: RT

## 2024-09-30 ASSESSMENT — PAIN SCALES - GENERAL
PAINLEVEL_OUTOF10: 0 - NO PAIN
PAINLEVEL: 0-NO PAIN
PAINLEVEL_OUTOF10: 0 - NO PAIN

## 2024-09-30 ASSESSMENT — PAIN - FUNCTIONAL ASSESSMENT
PAIN_FUNCTIONAL_ASSESSMENT: 0-10
PAIN_FUNCTIONAL_ASSESSMENT: 0-10

## 2024-09-30 NOTE — PATIENT INSTRUCTIONS
I recommend a right breast mammogram guided biopsy. A breast radiology physician will perform the procedure. Possible diagnoses include benign, atypia or cancer. Bruising and mild discomfort after the biopsy is normal and will improve. I typically have results in 3-5 business days. I will call you with the results, please have your phone handy to take my call. If you receive medical information from [facility] Personal Health Record, it is possible to view or see results of your biopsy or procedure before I contact you directly. I will provide recommendations for future follow up based on your biopsy results.     You can see your health information, review clinical summaries from office visits & test results online when you follow your health with MY  Chart, a personal health record. To sign up go to [email or web address]. If you need assistance with signing up or trouble getting into your account call [employer] Patient Line 24/7 at [phone].    Should you have any questions or concerns after biopsy, please do not hesitate to call my office at [phone]. If it has been more than a week since your biopsy was performed and you have not received results, please call my office at [phone]. Thank you for choosing [facility] and trusting me as your healthcare provider. I am honored to be a provider on your health care team and I remain dedicated to helping you achieve your health goals.

## 2024-10-01 ENCOUNTER — TELEPHONE (OUTPATIENT)
Dept: RADIOLOGY | Facility: CLINIC | Age: 54
End: 2024-10-01
Payer: COMMERCIAL

## 2024-10-04 ENCOUNTER — TELEPHONE (OUTPATIENT)
Dept: SURGERY | Facility: HOSPITAL | Age: 54
End: 2024-10-04
Payer: COMMERCIAL

## 2024-10-04 DIAGNOSIS — Z12.39 BREAST CANCER SCREENING, HIGH RISK PATIENT: Primary | ICD-10-CM

## 2024-10-04 LAB
LABORATORY COMMENT REPORT: NORMAL
PATH REPORT.FINAL DX SPEC: NORMAL
PATH REPORT.GROSS SPEC: NORMAL
PATH REPORT.RELEVANT HX SPEC: NORMAL
PATH REPORT.TOTAL CANCER: NORMAL

## 2024-10-04 RX ORDER — TAMOXIFEN CITRATE 20 MG/1
20 TABLET ORAL DAILY
Qty: 90 TABLET | Refills: 3 | Status: SHIPPED | OUTPATIENT
Start: 2024-10-04 | End: 2025-10-04

## 2024-10-04 NOTE — TELEPHONE ENCOUNTER
Spoke with Abril regarding right breast biopsy results, benign and concordant. She can return in one year for annual mammogram and exam as discussed. She will proceed to Fast MRI in April 2025. Thoroughly discussed Tamoxifen 10mg/20mg for risk reduction. Side effects and risks discussed. She is in agreement to start. Sent to requested pharmacy. Encourged to take Wellbutrin 12 hours apart if possible. Safe to take with Wellbutrin.

## 2024-11-25 NOTE — PROGRESS NOTES
Patient: Abril Gregory    [id number]  : 1970 -- AGE 54 y.o.    Provider: Angel Stokes MD PhD     Location [facility]   Service Date: 2024          [facility]  Followup Visit Note    HISTORY OF PRESENT ILLNESS     The patient's referring provider is: Sarah Hamlin DO     REASON FOR VISIT:   Chief Complaint   Patient presents with    pt fuv        HISTORY OF PRESENT ILLNESS   MsChitra Gregory is a 54 y.o. female with past medical history of allergic rhinitis, depression/anxiety, HLD, ADHD, back pain who returns to a [facility] for her MEGAN.    PAST SLEEP HISTORY  She had a HSAT on 24 at a BMI of 30.6. TMT was 379 minutes. Her REI3% was 49/h (supine 74/h and nonsupine 24/h) and her REI4% was 46/h (supine 72/h and nonsupine 19/h). The O2 ronnie was 76% and T88 was 15.5 minutes.     She had issues of unrefreshing sleep and migranes waking her up with feelings of severe nausea..     CURRENT HISTORY  At her last visit, given that she had severe MEGAN, APAP at 5-15 cwp was recommended.     On today's visit she reports she feels CPAP has been a burden. She likes to sleep on her stomach and CPAP makes it more difficult. She sleeps about an hour less since starting CPAP. Since she sleeps alone she does not know if she snore on CPAP. She feels she is a mouth breathe but has noted improvements in dry mouth on CPAP. Other benefits include less headaches, but she was started on verapamil. She started verapamil before the CPAP. She also recalls more pleasant dreams since starting CPAP.    Sleep schedule:  In bed:  12AM  Activities in bed:   Bedtime Activities: turns out the lights  Subjective sleep latency:  30 min  Awakenings during night: 2-3x/night  Length of awakenings: falls asleep pretty quickly  Final awakening time: 6 AM  Out of bed: 6 AM  Overall estimate of total sleep time: 6hr/night    Weekends:   In bed:  midnight  Subjective sleep latency:  30 min - 1 hour  Awakenings during night: 2-3x/night  Length of awakenings: falls asleep pretty quickly  Final awakening time: 9 AM  Out of bed: 9 AM  Overall estimate of total sleep time: 8 hr/night    Preferred sleeping position: prone  Typically sleeps alone.     Issues with sleep onset or maintenance: none      Daytime Symptoms:  On awakening patient reports: wake unrefreshed, morning headaches, morning dry mouth, hard to get out of bed, and  stimulant use (see med list)    Daytime: During the day, she does not doze unintentionally while inactive.  During more active tasks, she sometimes is drowsy.  With regards to daytime napping, the patient reports sometimes taking naps. If she takes a nap, typically she awakens refreshed at times.  She does report that poor sleep results in mood-related irritability. She does report that poor sleep results in issues with memory/concentration.    Driving History: she has a 4 min commute from home to work. The patient typically drives to work. With driving, the patient denies sleepy driving, with 0 sleepiness-related motor vehicle accidents and 0 near misses.      ESS: 9  RUSS: 7  FOSQ:  26      REVIEW OF SYSTEMS     REVIEW OF SYSTEMS  Review of Systems   All other systems reviewed and are negative.        ALLERGIES AND MEDICATIONS     ALLERGIES  No Known Allergies    MEDICATIONS  Current Outpatient Medications   Medication Sig Dispense Refill    azelastine (Astelin) 137 mcg (0.1 %) nasal spray USE 2 SPRAYS IN EACH NOSTRIL TWICE DAILY 30 mL 2    buPROPion XL (Wellbutrin XL) 300 mg 24 hr tablet Take 1 tablet (300 mg) by mouth once daily.      cholecalciferol (Vitamin D-3) 50 mcg (2,000 unit) capsule Take 1 capsule (50 mcg) by mouth early in the morning..      fluticasone (Flonase) 50 mcg/actuation nasal spray Administer 1 spray into each nostril 2 times a day. Shake gently. Before first use, prime pump. After use, clean tip and replace cap.  16 g 11    gabapentin (Neurontin) 300 mg capsule Take 1 capsule (300 mg) by mouth 2 times a day. 180 capsule 1    hyoscyamine 0.125 mg disintegrating tablet DISSOLVE 1 TABLET(0.125 MG) UNDER THE TONGUE EVERY 4 HOURS AS NEEDED 30 tablet 0    lisdexamfetamine (Vyvanse) 30 mg capsule Take 1 capsule (30 mg) by mouth once daily in the morning.      mometasone (Elocon) 0.1 % cream Apply thin film to itching/flaking skin of outer ears twice daily x 7 days then use as needed. 15 g 1    ondansetron (Zofran) 8 mg tablet Take by mouth.      SUMAtriptan (Imitrex) 20 mg/actuation nasal spray 20 mg as a single dose in 1 nostril; if symptoms persist or return, may repeat dose after =2 hours. 1 each 0    SUMAtriptan (Imitrex) 50 mg tablet Take 1 tablet (50 mg) by mouth 1 time if needed for migraine. 9 tablet 3    tamoxifen (Nolvadex) 20 mg tablet Take 1 tablet (20 mg total) by mouth once daily.  Take with water or any other nonalcoholic drink with or without food at around the same time(s) every day. 90 tablet 3    verapamil (Calan) 120 mg tablet Take 1 tablet (120 mg) by mouth once daily.      hyoscyamine 0.125 mg disintegrating tablet Place 1 tablet (0.125 mg) under the tongue every 4 hours if needed (PRN). 30 tablet 0    LORazepam (Ativan) 0.5 mg tablet Take 1-2 tablets (0.5-1 mg) by mouth see administration instructions. Take 30 minutes prior to procedure. (Patient not taking: Reported on 11/26/2024) 4 tablet 0     No current facility-administered medications for this visit.     PAST HISTORY     PAST MEDICAL HISTORY  She  has a past medical history of Adenomyosis of the uterus and Personal history of other benign neoplasm (02/08/2019).    No Known Allergies     PAST SURGICAL HISTORY:  Past Surgical History:   Procedure Laterality Date    OTHER SURGICAL HISTORY  01/31/2019    Uterine myomectomy    OTHER SURGICAL HISTORY  01/31/2019    Cervical loop electrosurgical excision    OTHER SURGICAL HISTORY  07/01/2020    Hysterectomy        FAMILY HISTORY  Family History   Problem Relation Name Age of Onset    Other (waldenstrom's) Mother 72     Breast cancer Mother 72 70    Diabetes Mother 72     Melanoma Mother 72     Heart attack Father  40    Epilepsy Brother      Pancreatic cancer Other Paternal aunt     Ovarian cancer Other          great aunt on maternal side    Breast cancer Mother's Sister 40      She does have a family history of sleep disorder (e.g., sleep apnea, narcolepsy in any first degree relatives). Mother possible RLS. Brother with nocturnal epilepsy and MEGAN on CPAP.     SOCIAL HISTORY  She  reports that she has quit smoking. Her smoking use included cigarettes. She has never used smokeless tobacco. She reports current drug use. Drug: Marijuana. She reports that she does not drink alcohol. She currently lives alone.     Work history: The patient currently works full time as a  .     Caffeine consumption: Yes - 24 oz coffee daily  Alcohol consumption: Yes - occasionally 1x/week  Smoking: No - quit 2008  Marijuana: Yes - for insomnia that occurred when she quit marijuana recently.  1 bowl before bed.    PHYSICAL EXAM     Physical Examination: /81   Pulse 82   Temp 36.3 °C (97.3 °F) (Temporal)   Wt 72.1 kg (159 lb)   SpO2 95%   BMI 30.53 kg/m²     PREVIOUS WEIGHTS:  Wt Readings from Last 3 Encounters:   11/26/24 72.1 kg (159 lb)   09/30/24 72.6 kg (160 lb 0.9 oz)   09/30/24 72.7 kg (160 lb 2.6 oz)     General: The patient is a pleasant female, in no acute distress. HEENT: She has a  a modified Mallampati grade 4 airway, unable to visualize tonsils. The soft palate was normal and low hanging and the uvula was normal thickened. The A/P diameter of the velopharynx was narrowed. The oropharynx was shallow in the A/P diameter. Lateral wall narrowing was present. Tongue ridging was not present. Erythema of the posterior pharynx was not present. Mucosal hypertrophy in the posterior oropharynx  "was not present. Mild Retrognathia was and micrognathia was present. Neck: The neck was not enlarged. No JVP, bruits or lymphadenopathy was appreciated. Chest: Clear to auscultation. No wheezes, rales, or rhonchi. Cardiovascular: Regular rate and rhythm. No murmurs, gallops, or clicks. Abdomen: Soft, nontender, nondistended. Positive bowel sounds. Extremities: No clubbing, cyanosis, or edema is noted. Neurologic exam: Alert, oriented x3 and was grossly non-focal.    RESULTS/DATA     No results found for: \"IRON\", \"TRANSFERRIN\", \"IRONSAT\", \"TIBC\", \"FERRITIN\"    Bicarbonate (mmol/L)   Date Value   04/03/2024 28   01/11/2023 29   02/07/2022 27   02/17/2021 31       PAP DATA ADHERENCE        DIAGNOSES     Problem List and Orders  There are no diagnoses linked to this encounter.    ASSESSMENT/PLAN     Ms. Gregory is a 54 y.o. female and with past medical history of allergic rhinitis, depression/anxiety, HLD, ADHD, back pain. She presents to  the [facility] to address MEGAN.  Patient is willing to try CPAP therapy.  We discussed CPAP compliance and importance of treatment.  Patient expressed understanding and was agreeable with plan.     #MEGAN  -noted severe MEGAN on HSAT with positional component  -On APAP 5-15 cmH2O via [facility]  - Some issues tolerating CPAP but wants to stick with current mask  - Encouraged to call [facility] if she wants to trial a different mask  - gave positive encouragement that benefits have been less dry mouth, better dreams, and perhaps less headaches    #Obesity  -BMI today 30  -Discussed continuing to work on weight loss measures to improve MEGAN.    Follow up: 3-4months    Angel Stokes MD PhD        "

## 2024-11-26 ENCOUNTER — APPOINTMENT (OUTPATIENT)
Dept: SLEEP MEDICINE | Facility: HOSPITAL | Age: 54
End: 2024-11-26
Payer: COMMERCIAL

## 2024-11-26 VITALS
OXYGEN SATURATION: 95 % | HEART RATE: 82 BPM | BODY MASS INDEX: 30.53 KG/M2 | WEIGHT: 159 LBS | DIASTOLIC BLOOD PRESSURE: 81 MMHG | SYSTOLIC BLOOD PRESSURE: 117 MMHG | TEMPERATURE: 97.3 F

## 2024-11-26 DIAGNOSIS — E66.09 CLASS 1 OBESITY DUE TO EXCESS CALORIES WITH BODY MASS INDEX (BMI) OF 30.0 TO 30.9 IN ADULT, UNSPECIFIED WHETHER SERIOUS COMORBIDITY PRESENT: ICD-10-CM

## 2024-11-26 DIAGNOSIS — G47.33 OSA (OBSTRUCTIVE SLEEP APNEA): Primary | ICD-10-CM

## 2024-11-26 DIAGNOSIS — E66.811 CLASS 1 OBESITY DUE TO EXCESS CALORIES WITH BODY MASS INDEX (BMI) OF 30.0 TO 30.9 IN ADULT, UNSPECIFIED WHETHER SERIOUS COMORBIDITY PRESENT: ICD-10-CM

## 2024-11-26 PROCEDURE — 99214 OFFICE O/P EST MOD 30 MIN: CPT | Performed by: INTERNAL MEDICINE

## 2024-11-26 PROCEDURE — 1036F TOBACCO NON-USER: CPT | Performed by: INTERNAL MEDICINE

## 2024-11-26 RX ORDER — VERAPAMIL HYDROCHLORIDE 120 MG/1
120 TABLET, FILM COATED ORAL DAILY
COMMUNITY

## 2024-11-26 ASSESSMENT — LIFESTYLE VARIABLES
HOW OFTEN DO YOU HAVE SIX OR MORE DRINKS ON ONE OCCASION: NEVER
HOW MANY STANDARD DRINKS CONTAINING ALCOHOL DO YOU HAVE ON A TYPICAL DAY: PATIENT DOES NOT DRINK
SKIP TO QUESTIONS 9-10: 1
AUDIT-C TOTAL SCORE: 0
HOW OFTEN DO YOU HAVE A DRINK CONTAINING ALCOHOL: NEVER

## 2024-11-26 ASSESSMENT — PAIN SCALES - GENERAL: PAINLEVEL_OUTOF10: 0-NO PAIN

## 2024-11-26 NOTE — PATIENT INSTRUCTIONS
[facility]  [facility] [place]  [address]  [facility] [address]  [phone]    [facility] [place]  [address]  [facility] [address]  [phone]  [facility] [place]  [address]  [facility] 6TH FLOOR  [facility] [address]           NAME: Abril Gregory   DATE: 11/26/2024     Your Sleep Provider Today: Angel Stokes MD PhD  Your Primary Care Physician: Sarah Hamlin, DO   Your Referring Provider: No ref. provider found    Thank you for coming to the Sleep Medicine Clinic today! Your sleep medicine provider today was: Angel Stokes MD PhD Below is a summary of your treatment plan, other important information, and our contact numbers:  If you need to schedule an appointment, please call [phone])  If you need general assistance (e.g. forms completed, general questions), please call my , Destini, at [phone].  If you have a medical question about your sleep issues, please contact our nurses, Lila or Bryanna at [phone].   You can also contact us through frestyl.      DIAGNOSIS:   MEGAN      TREATMENT PLAN     Will keep using your current mask      Instructions - Common MEGAN Recs: - For your sleep apnea, continue to use your PAP every night and use it whenever you are sleeping.   - Avoid alcohol or sedatives several hours prior to sleeping.   - Get additional supplies for your PAP (e.g., mask, hose, filters) every 3 months or as your insurance allows from your [employer] company. Replacement cushions for your PAP mask can be requested monthly if airseals are an issue.  - Remember to clean your mask, tubings, and water chamber regularly as instructed.  - Avoid driving or operating heavy machinery when drowsy. A person driving while sleepy is five (5) times more likely to have an accident. If you feel sleepy, pull over and take a short  power nap (sleep for less than 30 minutes). Otherwise, ask somebody to drive you.      Follow-up Appointment:   Followup with me in 3 months.      IMPORTANT INFORMATION     Call 911 for medical emergencies.  Our offices are generally open from Monday-Friday, 9 am - 5 pm.  If you need to get in touch with me, you may either call me and my team(number is below) or you can use [employer].  If a referral for a test, for CPAP, or for another specialist was made, and you have not heard about scheduling this within a week, please call scheduling at [phone]).  If you are unable to make your appointment for clinic or an overnight study, kindly call the office at least 48 hours in advance to cancel and reschedule.  If you are on CPAP, please bring your device's card or the device to each clinic appointment.   There are no supporting services by either the sleep doctors or their staff on weekends and Holidays, or after 5 PM on weekdays.   If you have been asked to come to a sleep study, make sure you bring toiletries, a comfy pillow, and any nighttime medications that you may regularly take. Also be sure to eat dinner before you arrive. We generally do not provide meals.      PRESCRIPTIONS     We require 7 days advanced notice for prescription refills. If we do not receive the request in this time, we cannot guarantee that your medication will be refilled in time.      IMPORTANT PHONE NUMBERS      scheduling for medical testin596 - [phone]   Sleep Medicine Clinic Fax: [phone]  Appointments (for Pediatric Sleep Clinic): [phone] - option 1  Appointments (for Adult Sleep Clinic): [phone] - option 2  Appointments (For Sleep Studies): [phone] - option 3  Financial Assistance Program: Call [phone] (For [facility] services: call [phone])  Website:  Financial Assistance  Behavioral Sleep Medicine: [phone]  Bariatric Surgery: [phone] ( Bariatric  Surgery Website)   Sleep Surgery: [phone]  ENT (Otolaryngology): [phone]  Myofunctional Therapy (ENT): ABRAHAM Tom, Cordell Clifford, José Tapia; [phone]   Dinesh Alcantar; [phone]  Kourtney Frank; [phone]  [facility] - [facility]; [phone]  Vj Perdomo/[facility] [phone] (option 1)  Headache Clinic (Neurology): [phone]  Neurology: [phone]  Psychiatry: [phone]  Pulmonary Function Testing (PFT) Center: [phone]  Pulmonary Medicine: [phone]  [address] (DME): [phone]  [employer] (DME): [phone]  [facility] ([place]): [phone]      COMMON PROVIDERS WE REFER TO     For Weight Loss - Dr. Flaco Jones - Call [phone]  For Sleep Surgery - Dr. Kavya Lopes - Call [phone]      OUR ADULT SLEEP MEDICINE TEAM   Please do not hesitate to call the office or sleep nurse with any questions between appointments:    Adult Sleep Nurses (Bryanna Stark, RN and Lila Jung RN):  For clinical questions and refilling prescriptions: [phone]  Email sleep diaries and other documents at: [email or web address]    Adult Sleep Medicine Secretaries:  Aziza Robb (For Yamileth/Yañez/Krise/Strohl/Yeh/Huitron):   P: [phone]  F: [phone]  Destini Norton (For Stokes/Guggenbiller): P: [phone]  Fax: [phone]  Marisol Alarcon (For Jurcevic/Blank): P: [phone]  F: [phone]  Sophia Buchanan (For Carmela): P: [phone]  F: [phone]  Joanne Davison (For Lillie/Jennifer/Zakhary): P: [phone]  F: [phone]  Kathryn Mckeon (For Devonte/Cristobal): P: [phone]  F: [phone]     Adult Sleep Medicine Advanced Practice Providers:  Ruben Ruggiero ([place], [place])  Parisa Rodriguez ([facility])  Janna Galvez CNP ([place], [place], [facility])  Niyah Staples CNP ([place], [place], [facility])  Ashli Casey ([place], [place], [place])  Mikey Jain CNP  "([place], [place])        OUR SLEEP TESTING LOCATIONS     Our team will contact you to schedule your sleep study, however, you can contact us as follow:  Main Phone Line (scheduling only): [phone]), option 3  Adult and Pediatric Locations   [place] (6 years and older): [facility] - 4th floor ([address], [facility]) After hours line: [phone]  [facility] (Main campus: All ages): [facility], 6th floor. After hours line: [phone]   [place] (5 years and older; younger considered on case-by-case basis): [address]; [address], [address]. Scheduling  After hours line: [phone]   [place] (6 years and older): [address]; [address]; [address]   [place] (6 years and older): [address] [facility], [address]  After hours line: [phone]   [place]th[place] (13 years and older) in [place]: [address], 2nd floor  After hours line: [phone]   [place] (13 year and older): [address]  After hours line: [phone]     Adult Only Locations:   [place] Cristina (18 years and older): [address] [facility], 2nd floor   [place] (18 years and older): [address] [facility]; 4th floor  After hours line: [phone]  [facility] (18 years and older) at [place]: [address] [facility]  After hours line: [phone]          CONTACTING YOUR SLEEP MEDICINE PROVIDER     Send a message directly to your provider through \"My Chart\", which is the email service through your  Records Account: [email or web address]   Call [phone] and leave a message. One of the administrative assistants will forward the message to your sleep medicine provider through \"My Chart\" and/or email.     Your sleep medicine provider for this visit was: Angel Stokes MD PhD        "

## 2025-02-20 DIAGNOSIS — M54.6 THORACIC BACK PAIN, UNSPECIFIED BACK PAIN LATERALITY, UNSPECIFIED CHRONICITY: ICD-10-CM

## 2025-02-21 RX ORDER — GABAPENTIN 300 MG/1
CAPSULE ORAL
Qty: 180 CAPSULE | Refills: 1 | Status: SHIPPED | OUTPATIENT
Start: 2025-02-21

## 2025-03-10 NOTE — PROGRESS NOTES
Patient: Abril Gregory    [id number]  : 1970 -- AGE 54 y.o.    Provider: Angel Stokes MD PhD     Location [facility]   Service Date: 3/11/2025          [facility]  Followup Visit Note    HISTORY OF PRESENT ILLNESS     The patient's referring provider is: Sarah Hamlin DO     REASON FOR VISIT:   Chief Complaint   Patient presents with    Follow-up    Sleep Apnea        HISTORY OF PRESENT ILLNESS   Ms. Abril Gregory is a 54 y.o. female with past medical history of allergic rhinitis, depression/anxiety, HLD, ADHD, back pain who returns to a [facility] for her MEGAN.    PAST SLEEP HISTORY  She had a HSAT on 24 at a BMI of 30.6. TMT was 379 minutes. Her REI3% was 49/h (supine 74/h and nonsupine 24/h) and her REI4% was 46/h (supine 72/h and nonsupine 19/h). The O2 ronnie was 76% and T88 was 15.5 minutes.     She had issues of unrefreshing sleep and migranes waking her up with feelings of severe nausea..     CURRENT HISTORY  At her last visit, CPAP was difficult for her to use due to her preference to sleep prone. Plan had been to try and continue with PAP. We gave her positive encouragement  given she was experiencing some benefits such as ess dry mouth, better dreams, and perhaps less headaches.    On today's visit, she has been continuing to use CPAP.  On CPAP she does not snore, gasp, nor choke.  She benefits with having less mouth dryness and less morning headaches.    Some issues with using CPAP include issues with the mask fit.  She also feels that there is some bubbles that can appear in her mouth at night.  She denies that there is bubbling or gurgling of water within the hose.  She also gets frustrated sometimes that it is difficult to manage the hose when she turns from 1 side to the other.  She does have some intermittent rhinorrhea issues which preceded the use of CPAP.  She currently uses Flonase and  Astelin for that.    Sleep schedule:  In bed:  12AM  Activities in bed:   Bedtime Activities: turns out the lights  Subjective sleep latency:  30 min  Awakenings during night: 2-3x/night  Length of awakenings: falls asleep pretty quickly  Final awakening time: 6 AM  Out of bed: 6 AM  Overall estimate of total sleep time: 6hr/night    Weekends:   In bed: midnight  Subjective sleep latency:  30 min - 1 hour  Awakenings during night: 2-3x/night  Length of awakenings: falls asleep pretty quickly  Final awakening time: 9 AM  Out of bed: 9 AM  Overall estimate of total sleep time: 8 hr/night    Preferred sleeping position: prone  Typically sleeps alone.     Issues with sleep onset or maintenance: none      Daytime Symptoms:  On awakening patient reports: wake unrefreshed, morning headaches, morning dry mouth, hard to get out of bed, and  stimulant use (see med list)    Daytime: During the day, she does not doze unintentionally while inactive.  During more active tasks, she sometimes is drowsy.  With regards to daytime napping, the patient reports sometimes taking naps. If she takes a nap, typically she awakens refreshed at times.  She does report that poor sleep results in mood-related irritability. She does report that poor sleep results in issues with memory/concentration.    Driving History: she has a 4 min commute from home to work. The patient typically drives to work. With driving, the patient denies sleepy driving, with 0 sleepiness-related motor vehicle accidents and 0 near misses.      ESS: 7  RUSS: 6  FOSQ:  30      REVIEW OF SYSTEMS     REVIEW OF SYSTEMS  Review of Systems   All other systems reviewed and are negative.        ALLERGIES AND MEDICATIONS     ALLERGIES  No Known Allergies    MEDICATIONS  Current Outpatient Medications   Medication Sig Dispense Refill    azelastine (Astelin) 137 mcg (0.1 %) nasal spray USE 2 SPRAYS IN EACH NOSTRIL TWICE DAILY 30 mL 2    buPROPion XL (Wellbutrin XL) 300 mg 24 hr  tablet Take 1 tablet (300 mg) by mouth once daily.      cholecalciferol (Vitamin D-3) 50 mcg (2,000 unit) capsule Take 1 capsule (50 mcg) by mouth early in the morning..      fluticasone (Flonase) 50 mcg/actuation nasal spray Administer 1 spray into each nostril 2 times a day. Shake gently. Before first use, prime pump. After use, clean tip and replace cap. 16 g 11    gabapentin (Neurontin) 300 mg capsule TAKE 1 CAPSULE(300 MG) BY MOUTH TWICE DAILY 180 capsule 1    hyoscyamine 0.125 mg disintegrating tablet DISSOLVE 1 TABLET(0.125 MG) UNDER THE TONGUE EVERY 4 HOURS AS NEEDED 30 tablet 0    lisdexamfetamine (Vyvanse) 30 mg capsule Take 1 capsule (30 mg) by mouth once daily in the morning.      mometasone (Elocon) 0.1 % cream Apply thin film to itching/flaking skin of outer ears twice daily x 7 days then use as needed. 15 g 1    ondansetron (Zofran) 8 mg tablet Take by mouth.      SUMAtriptan (Imitrex) 20 mg/actuation nasal spray 20 mg as a single dose in 1 nostril; if symptoms persist or return, may repeat dose after =2 hours. 1 each 0    tamoxifen (Nolvadex) 20 mg tablet Take 1 tablet (20 mg total) by mouth once daily.  Take with water or any other nonalcoholic drink with or without food at around the same time(s) every day. 90 tablet 3    verapamil (Calan) 120 mg tablet Take 1 tablet (120 mg) by mouth once daily.      hyoscyamine 0.125 mg disintegrating tablet Place 1 tablet (0.125 mg) under the tongue every 4 hours if needed (PRN). 30 tablet 0    LORazepam (Ativan) 0.5 mg tablet Take 1-2 tablets (0.5-1 mg) by mouth see administration instructions. Take 30 minutes prior to procedure. (Patient not taking: Reported on 11/26/2024) 4 tablet 0    SUMAtriptan (Imitrex) 50 mg tablet Take 1 tablet (50 mg) by mouth 1 time if needed for migraine. 9 tablet 3     No current facility-administered medications for this visit.     PAST HISTORY     PAST MEDICAL HISTORY  She  has a past medical history of Adenomyosis of the uterus  and Personal history of other benign neoplasm (02/08/2019).    No Known Allergies     PAST SURGICAL HISTORY:  Past Surgical History:   Procedure Laterality Date    OTHER SURGICAL HISTORY  01/31/2019    Uterine myomectomy    OTHER SURGICAL HISTORY  01/31/2019    Cervical loop electrosurgical excision    OTHER SURGICAL HISTORY  07/01/2020    Hysterectomy       FAMILY HISTORY  Family History   Problem Relation Name Age of Onset    Other (waldenstrom's) Mother 72     Breast cancer Mother 72 70    Diabetes Mother 72     Melanoma Mother 72     Heart attack Father  40    Epilepsy Brother      Pancreatic cancer Other Paternal aunt     Ovarian cancer Other          great aunt on maternal side    Breast cancer Mother's Sister 40      She does have a family history of sleep disorder (e.g., sleep apnea, narcolepsy in any first degree relatives). Mother possible RLS. Brother with nocturnal epilepsy and MEGAN on CPAP.     SOCIAL HISTORY  She  reports that she has quit smoking. Her smoking use included cigarettes. She has never used smokeless tobacco. She reports current drug use. Drug: Marijuana. She reports that she does not drink alcohol. She currently lives alone.     Work history: The patient currently works full time as a  .     Caffeine consumption: Yes - 24 oz coffee daily  Alcohol consumption: Yes - occasionally 1x/week  Smoking: No - quit 2008  Marijuana: Yes - for insomnia that occurred when she quit marijuana recently.  1 bowl before bed.    PHYSICAL EXAM     Physical Examination: /85   Pulse 79   Temp 36.7 °C (98 °F)   Wt 72.6 kg (160 lb)   SpO2 98% Comment: RA  BMI 30.72 kg/m²     PREVIOUS WEIGHTS:  Wt Readings from Last 3 Encounters:   03/11/25 72.6 kg (160 lb)   11/26/24 72.1 kg (159 lb)   09/30/24 72.6 kg (160 lb 0.9 oz)     General: The patient is a pleasant female, in no acute distress. HEENT: She has a  a modified Mallampati grade 4 airway, unable to visualize tonsils.  "The soft palate was normal and low hanging and the uvula was normal thickened. The A/P diameter of the velopharynx was narrowed. The oropharynx was shallow in the A/P diameter. Lateral wall narrowing was present. Tongue ridging was not present. Erythema of the posterior pharynx was not present. Mucosal hypertrophy in the posterior oropharynx was not present. Mild Retrognathia was and micrognathia was present. Neck: The neck was not enlarged. No JVP, bruits or lymphadenopathy was appreciated. Chest: Clear to auscultation. No wheezes, rales, or rhonchi. Cardiovascular: Regular rate and rhythm. No murmurs, gallops, or clicks. Abdomen: Soft, nontender, nondistended. Positive bowel sounds. Extremities: No clubbing, cyanosis, or edema is noted. Neurologic exam: Alert, oriented x3 and was grossly non-focal.    RESULTS/DATA     No results found for: \"IRON\", \"TRANSFERRIN\", \"IRONSAT\", \"TIBC\", \"FERRITIN\"    Bicarbonate (mmol/L)   Date Value   04/03/2024 28   01/11/2023 29   02/07/2022 27   02/17/2021 31       PAP DATA ADHERENCE        DIAGNOSES     Problem List and Orders  Diagnoses and all orders for this visit:  MEAGN (obstructive sleep apnea)  -     Follow Up In Adult Sleep Medicine; Future  Class 1 obesity due to excess calories with body mass index (BMI) of 30.0 to 30.9 in adult, unspecified whether serious comorbidity present      ASSESSMENT/PLAN     Ms. Gregory is a 54 y.o. female and with past medical history of allergic rhinitis, depression/anxiety, HLD, ADHD, back pain. She presents to  the [facility] to address MEGAN.  Patient is willing to try CPAP therapy.  We discussed CPAP compliance and importance of treatment.  Patient expressed understanding and was agreeable with plan.     #MEGAN  -noted severe MEGAN on HSAT with positional component  - Continue APAP 5-15 cmH2O via MSC  -Recommended a CPAP hose stand which she will look at  - She will also look at a positioner to help her with side " sleeping  - We discussed we could look at an oral appliance in the future; She is not interested in [employer]ire    #Obesity  -BMI today 30.7  -Discussed continuing to work on weight loss measures to improve MEGAN.    Follow up: 6 months    Angel Stokes MD PhD

## 2025-03-11 ENCOUNTER — APPOINTMENT (OUTPATIENT)
Dept: SLEEP MEDICINE | Facility: HOSPITAL | Age: 55
End: 2025-03-11
Payer: COMMERCIAL

## 2025-03-11 VITALS
TEMPERATURE: 98 F | WEIGHT: 160 LBS | SYSTOLIC BLOOD PRESSURE: 120 MMHG | OXYGEN SATURATION: 98 % | BODY MASS INDEX: 30.72 KG/M2 | HEART RATE: 79 BPM | DIASTOLIC BLOOD PRESSURE: 85 MMHG

## 2025-03-11 DIAGNOSIS — E66.09 CLASS 1 OBESITY DUE TO EXCESS CALORIES WITH BODY MASS INDEX (BMI) OF 30.0 TO 30.9 IN ADULT, UNSPECIFIED WHETHER SERIOUS COMORBIDITY PRESENT: ICD-10-CM

## 2025-03-11 DIAGNOSIS — E66.811 CLASS 1 OBESITY DUE TO EXCESS CALORIES WITH BODY MASS INDEX (BMI) OF 30.0 TO 30.9 IN ADULT, UNSPECIFIED WHETHER SERIOUS COMORBIDITY PRESENT: ICD-10-CM

## 2025-03-11 DIAGNOSIS — G47.33 OSA (OBSTRUCTIVE SLEEP APNEA): Primary | ICD-10-CM

## 2025-03-11 PROCEDURE — 1036F TOBACCO NON-USER: CPT | Performed by: INTERNAL MEDICINE

## 2025-03-11 PROCEDURE — 99214 OFFICE O/P EST MOD 30 MIN: CPT | Performed by: INTERNAL MEDICINE

## 2025-03-11 ASSESSMENT — PAIN SCALES - GENERAL: PAINLEVEL_OUTOF10: 0-NO PAIN

## 2025-03-11 NOTE — PATIENT INSTRUCTIONS
[facility] Sleep Medicine  [facility] [place]  [address]  [facility] [address]  [phone]    [facility] [place]  [address]  [facility] [address]  [phone]  [facility] [place]  [address]  [facility]  [facility] [address]           NAME: Abril Gregory   DATE: 3/11/2025     Your Sleep Provider Today: Angel Stokes MD PhD  Your Primary Care Physician: Sarah Hamlin, DO   Your Referring Provider: No ref. provider found    Thank you for coming to the Sleep Medicine Clinic today! Your sleep medicine provider today was: Angel Stokes MD PhD Below is a summary of your treatment plan, other important information, and our contact numbers:  If you need to schedule an appointment, please call [phone])  If you need general assistance (e.g. forms completed, general questions), please call my , Destini, at [phone].  If you have a medical question about your sleep issues, please contact our nurses, Lila or Bryanna at [phone].   You can also contact us through [employer].      DIAGNOSIS:   1. MEGAN (obstructive sleep apnea)                TREATMENT PLAN     Consider a CPAP hose stand    Instructions - Common MEGAN Recs: - For your sleep apnea, continue to use your PAP every night and use it whenever you are sleeping.   - Avoid alcohol or sedatives several hours prior to sleeping.   - Get additional supplies for your PAP (e.g., mask, hose, filters) every 3 months or as your insurance allows from your [employer] company. Replacement cushions for your PAP mask can be requested monthly if airseals are an issue.  - Remember to clean your mask, tubings, and water chamber regularly as instructed.  - Avoid driving or operating heavy machinery when drowsy. A person driving while sleepy is five (5) times more likely to have an accident. If you feel sleepy,  pull over and take a short power nap (sleep for less than 30 minutes). Otherwise, ask somebody to drive you.        Positional Therapy For Your Sleep Apnea  Example of Devices        You have what is called positional sleep apnea. This means that your sleep apnea is better if you sleep on your side and worse if you sleep on your back. Positional therapy for sleep apnea is only helpful if you can make sure that you are sleeping on your side. There are several ways to make sure you are sleeping on your back as shown below:    1. Tennis ball method       Sew  a pocket in the back of your pajama shirt and put a tennis ball in the pocket.   2. Remateee Bumper belt or       Anti-snore Shirt                   www.antisnoreshirt.com (Cost:? $100 )        This company makes several devices to assist patients in staying on their back - one that is modeled like a shirt with a foam or air bumper to prevent rolling over or a belt that is worn around mid-chest level.     3. Slumberbump       www.slumberbump.com (Cost: ? $70)     This device is a rectangular air bag (which you blow up) that is worn around the waist to prevent you from rolling over during the night.   4. Zzoma Positional Sleeper      www.Visible Measures.com (Cost:? $100)       This is device is worn as a belt around the mid-chest. Also contains a foam material that is an enclosure around the back.   5. Nightshift positioner  Http://nightshifttherapy.com/ (Cost:? $350-$400       This device is worn around the neck and provides a vibration to the back of the neck when you move to your back so as to remind you to shift back on to your side.   6. Nightshift positioner    https://www.Ubidyne.Theravance.[employer]Source/c-e/hs/sleep-apnea-therapy/i-was-just-diagnosed-with-sleep-apnea/cpap-alternative.html    (Cost:? $500-600)     This device is worn around the waist and provides a vibration to your chest/stomachwhen you move to your back so as to remind you to shift back on to your side.                  Follow-up Appointment:   Followup with me in 6 months.      IMPORTANT INFORMATION     Call 911 for medical emergencies.  Our offices are generally open from Monday-Friday, 9 am - 5 pm.  If you need to get in touch with me, you may either call me and my team(number is below) or you can use Stir.  If a referral for a test, for CPAP, or for another specialist was made, and you have not heard about scheduling this within a week, please call scheduling at [phone]).  If you are unable to make your appointment for clinic or an overnight study, kindly call the office at least 48 hours in advance to cancel and reschedule.  If you are on CPAP, please bring your device's card or the device to each clinic appointment.   There are no supporting services by either the sleep doctors or their staff on weekends and Holidays, or after 5 PM on weekdays.   If you have been asked to come to a sleep study, make sure you bring toiletries, a comfy pillow, and any nighttime medications that you may regularly take. Also be sure to eat dinner before you arrive. We generally do not provide meals.      PRESCRIPTIONS     We require 7 days advanced notice for prescription refills. If we do not receive the request in this time, we cannot guarantee that your medication will be refilled in time.      IMPORTANT PHONE NUMBERS      scheduling for medical testin777 - [phone]   Sleep Medicine Clinic Fax: [phone]  Appointments (for Pediatric Sleep Clinic): [phone] - option 1  Appointments (for Adult Sleep Clinic): [phone] - option 2  Appointments (For Sleep Studies): [phone] - option 3  Financial Assistance Program: Call [phone] (For [facility] services: call [phone])  Website:  Financial Assistance  Behavioral Sleep Medicine: [phone]  Bariatric Surgery: [phone] ( Bariatric Surgery Website)   Sleep Surgery: [phone]  ENT (Otolaryngology):  "[phone]  Myofunctional Therapy (ENT): Negro, ABRAHAM, Cordell Clifford, Willie, José; [phone]   Dinesh Alcantar; [phone]  Kourtney Frank; [phone]  [facility] - [place]; [phone]  Vj Perdomo/Gena - [phone] (option 1)  Headache Clinic (Neurology): [phone]  Neurology: [phone]  Psychiatry: [phone]  Pulmonary Function Testing (PFT) Center: [phone]  Pulmonary Medicine: [phone]  [employer] (DME): [phone]  [employer] (DME): [phone]  [employer] [facility] (DME): [phone]      COMMON PROVIDERS WE REFER TO     For Weight Loss - Dr. Flaco Jones - Call [phone]  For Sleep Surgery - Dr. Kavya Lopes - Call [phone]      OUR ADULT SLEEP MEDICINE TEAM   Please do not hesitate to call the office or sleep nurse with any questions between appointments:    Adult Sleep Nurses (Bryanna Stark, RN and Olivia Chavez RN):  For clinical questions and refilling prescriptions: [phone]  Email sleep diaries and other documents at: [email or web address]    My :  Aziza Robb   P: [phone]  F: [phone]      CONTACTING YOUR SLEEP MEDICINE PROVIDER     Send a message directly to your provider through \"My Chart\", which is the email service through your  Records Account: [email or web address]   Call [phone] and leave a message. One of the administrative assistants will forward the message to your sleep medicine provider through \"My Chart\" and/or email.     Your sleep medicine provider for this visit was: Angel Stokes MD PhD        "

## 2025-04-16 ENCOUNTER — APPOINTMENT (OUTPATIENT)
Dept: PRIMARY CARE | Facility: CLINIC | Age: 55
End: 2025-04-16
Payer: COMMERCIAL

## 2025-05-06 ENCOUNTER — OFFICE VISIT (OUTPATIENT)
Dept: URGENT CARE | Age: 55
End: 2025-05-06
Payer: COMMERCIAL

## 2025-05-06 VITALS
RESPIRATION RATE: 18 BRPM | TEMPERATURE: 98.2 F | SYSTOLIC BLOOD PRESSURE: 129 MMHG | DIASTOLIC BLOOD PRESSURE: 85 MMHG | OXYGEN SATURATION: 97 % | HEART RATE: 84 BPM

## 2025-05-06 DIAGNOSIS — J40 BRONCHITIS: Primary | ICD-10-CM

## 2025-05-06 DIAGNOSIS — R05.1 ACUTE COUGH: ICD-10-CM

## 2025-05-06 LAB
POC CORONAVIRUS SARS-COV-2 PCR: NEGATIVE
POC HUMAN RHINOVIRUS PCR: NEGATIVE
POC INFLUENZA A VIRUS PCR: NEGATIVE
POC INFLUENZA B VIRUS PCR: NEGATIVE
POC RESPIRATORY SYNCYTIAL VIRUS PCR: NEGATIVE

## 2025-05-06 PROCEDURE — 1036F TOBACCO NON-USER: CPT | Performed by: EMERGENCY MEDICINE

## 2025-05-06 PROCEDURE — 87631 RESP VIRUS 3-5 TARGETS: CPT | Performed by: EMERGENCY MEDICINE

## 2025-05-06 PROCEDURE — 99214 OFFICE O/P EST MOD 30 MIN: CPT | Performed by: EMERGENCY MEDICINE

## 2025-05-06 RX ORDER — AZITHROMYCIN 250 MG/1
TABLET, FILM COATED ORAL
Qty: 6 TABLET | Refills: 0 | Status: SHIPPED | OUTPATIENT
Start: 2025-05-06 | End: 2025-05-11

## 2025-05-06 ASSESSMENT — ENCOUNTER SYMPTOMS: COUGH: 1

## 2025-05-06 NOTE — PROGRESS NOTES
Subjective   Patient ID: Abril Gregory is a 54 y.o. female. They present today with a chief complaint of Cough (3 days).    History of Present Illness  HPI  This is a 54-year-old female presents today complaining of nonproductive cough for the past 3 days not associated with any fever or chills.  Patient states her symptoms are worse at night.  She denies any nausea or vomiting she admits to mild shortness of breath.  Past Medical History  Allergies as of 05/06/2025    (No Known Allergies)       Prescriptions Prior to Admission[1]     Medical History[2]    Surgical History[3]     reports that she has quit smoking. Her smoking use included cigarettes. She has never used smokeless tobacco. She reports current drug use. Drug: Marijuana. She reports that she does not drink alcohol.    Review of Systems  Review of Systems   Respiratory:  Positive for cough.    All other systems reviewed and are negative.                                 Objective    Vitals:    05/06/25 1147   BP: 129/85   Pulse: 84   Resp: 18   Temp: 36.8 °C (98.2 °F)   SpO2: 97%     No LMP recorded. Patient is postmenopausal.    Physical Exam  Patient is awake alert oriented x 3 in no acute distress vital signs are stable.  She is afebrile.  Appears to be slightly upset over her condition.  Airway is patent.  Throat nonerythematous.  Neck supple.  Well-hydrated.  Nontoxic-appearing.  Lungs diminished throughout the lung field.  Procedures    Point of Care Test & Imaging Results from this visit  Results for orders placed or performed in visit on 05/06/25   POCT SPOTFIRE R/ST Panel Mini w/COVID ([facility]) manually resulted    Specimen: Swab   Result Value Ref Range    POC Sars-Cov-2 PCR Negative Negative    POC Respiratory Syncytial Virus PCR Negative Negative    POC Influenza A Virus PCR Negative Negative    POC Influenza B Virus PCR Negative Negative    POC Human Rhinovirus PCR Negative Negative      Imaging  No results found.    Cardiology, Vascular,  and Other Imaging  Corneal Topography - OU - Both Eyes  Result Date: 5/5/2025  Right Eye Progression has no prior data. Findings include normal observations. Left Eye Progression has no prior data. Findings include normal observations.         Diagnostic study results (if any) were reviewed by Jude Kelley DO.    Assessment/Plan   Allergies, medications, history, and pertinent labs/EKGs/Imaging reviewed by Jude Kelley DO.     Medical Decision Making  Patient was informed of her lab results.  She was then reassured however she requested antibiotic treatment.    Orders and Diagnoses  Diagnoses and all orders for this visit:  Bronchitis  -     azithromycin (Zithromax) 250 mg tablet; Take 2 tabs (500 mg) by mouth today, than 1 daily for 4 days.  Acute cough  -     POCT SPOTFIRE R/ST Panel Mini w/COVID ([facility]) manually resulted      Medical Admin Record      Patient disposition: Home    Electronically signed by Jude Kelley DO  12:40 PM           [1] (Not in a hospital admission)   [2]   Past Medical History:  Diagnosis Date    Adenomyosis of the uterus     Adenomyosis    Personal history of other benign neoplasm 02/08/2019    History of other benign neoplasm   [3]   Past Surgical History:  Procedure Laterality Date    OTHER SURGICAL HISTORY  01/31/2019    Uterine myomectomy    OTHER SURGICAL HISTORY  01/31/2019    Cervical loop electrosurgical excision    OTHER SURGICAL HISTORY  07/01/2020    Hysterectomy